# Patient Record
Sex: FEMALE | Race: OTHER | HISPANIC OR LATINO | URBAN - METROPOLITAN AREA
[De-identification: names, ages, dates, MRNs, and addresses within clinical notes are randomized per-mention and may not be internally consistent; named-entity substitution may affect disease eponyms.]

---

## 2017-03-07 ENCOUNTER — OUTPATIENT (OUTPATIENT)
Dept: OUTPATIENT SERVICES | Facility: HOSPITAL | Age: 62
LOS: 1 days | End: 2017-03-07
Payer: MEDICARE

## 2017-03-07 ENCOUNTER — APPOINTMENT (OUTPATIENT)
Dept: MAMMOGRAPHY | Facility: CLINIC | Age: 62
End: 2017-03-07

## 2017-03-07 DIAGNOSIS — Z98.89 OTHER SPECIFIED POSTPROCEDURAL STATES: Chronic | ICD-10-CM

## 2017-03-07 DIAGNOSIS — Z00.8 ENCOUNTER FOR OTHER GENERAL EXAMINATION: ICD-10-CM

## 2017-03-07 PROCEDURE — 77063 BREAST TOMOSYNTHESIS BI: CPT

## 2017-03-07 PROCEDURE — 77067 SCR MAMMO BI INCL CAD: CPT

## 2018-05-18 ENCOUNTER — OUTPATIENT (OUTPATIENT)
Dept: OUTPATIENT SERVICES | Facility: HOSPITAL | Age: 63
LOS: 1 days | End: 2018-05-18
Payer: MEDICARE

## 2018-05-18 ENCOUNTER — APPOINTMENT (OUTPATIENT)
Dept: MAMMOGRAPHY | Facility: CLINIC | Age: 63
End: 2018-05-18
Payer: MEDICARE

## 2018-05-18 DIAGNOSIS — Z98.89 OTHER SPECIFIED POSTPROCEDURAL STATES: Chronic | ICD-10-CM

## 2018-05-18 DIAGNOSIS — Z12.31 ENCOUNTER FOR SCREENING MAMMOGRAM FOR MALIGNANT NEOPLASM OF BREAST: ICD-10-CM

## 2018-05-18 PROCEDURE — 77063 BREAST TOMOSYNTHESIS BI: CPT

## 2018-05-18 PROCEDURE — 77067 SCR MAMMO BI INCL CAD: CPT | Mod: 26

## 2018-05-18 PROCEDURE — 77067 SCR MAMMO BI INCL CAD: CPT

## 2018-05-18 PROCEDURE — 77063 BREAST TOMOSYNTHESIS BI: CPT | Mod: 26

## 2019-06-04 ENCOUNTER — OUTPATIENT (OUTPATIENT)
Dept: OUTPATIENT SERVICES | Facility: HOSPITAL | Age: 64
LOS: 1 days | End: 2019-06-04
Payer: MEDICARE

## 2019-06-04 ENCOUNTER — APPOINTMENT (OUTPATIENT)
Dept: MAMMOGRAPHY | Facility: CLINIC | Age: 64
End: 2019-06-04
Payer: MEDICARE

## 2019-06-04 DIAGNOSIS — Z12.31 ENCOUNTER FOR SCREENING MAMMOGRAM FOR MALIGNANT NEOPLASM OF BREAST: ICD-10-CM

## 2019-06-04 DIAGNOSIS — Z98.89 OTHER SPECIFIED POSTPROCEDURAL STATES: Chronic | ICD-10-CM

## 2019-06-04 PROCEDURE — 77067 SCR MAMMO BI INCL CAD: CPT | Mod: 26

## 2019-06-04 PROCEDURE — 77067 SCR MAMMO BI INCL CAD: CPT

## 2019-06-04 PROCEDURE — 77063 BREAST TOMOSYNTHESIS BI: CPT

## 2019-06-04 PROCEDURE — 77063 BREAST TOMOSYNTHESIS BI: CPT | Mod: 26

## 2021-07-20 ENCOUNTER — OUTPATIENT (OUTPATIENT)
Dept: OUTPATIENT SERVICES | Facility: HOSPITAL | Age: 66
LOS: 1 days | End: 2021-07-20
Payer: MEDICARE

## 2021-07-20 ENCOUNTER — APPOINTMENT (OUTPATIENT)
Dept: MAMMOGRAPHY | Facility: CLINIC | Age: 66
End: 2021-07-20
Payer: MEDICARE

## 2021-07-20 DIAGNOSIS — Z98.89 OTHER SPECIFIED POSTPROCEDURAL STATES: Chronic | ICD-10-CM

## 2021-07-20 DIAGNOSIS — Z00.00 ENCOUNTER FOR GENERAL ADULT MEDICAL EXAMINATION WITHOUT ABNORMAL FINDINGS: ICD-10-CM

## 2021-07-20 PROCEDURE — 77063 BREAST TOMOSYNTHESIS BI: CPT

## 2021-07-20 PROCEDURE — 77067 SCR MAMMO BI INCL CAD: CPT | Mod: 26

## 2021-07-20 PROCEDURE — 77067 SCR MAMMO BI INCL CAD: CPT

## 2021-07-20 PROCEDURE — 77063 BREAST TOMOSYNTHESIS BI: CPT | Mod: 26

## 2022-10-26 ENCOUNTER — EMERGENCY (EMERGENCY)
Facility: HOSPITAL | Age: 67
LOS: 1 days | Discharge: HOME | End: 2022-10-26
Attending: EMERGENCY MEDICINE
Payer: MEDICARE

## 2022-10-26 VITALS
HEART RATE: 119 BPM | DIASTOLIC BLOOD PRESSURE: 55 MMHG | TEMPERATURE: 99 F | RESPIRATION RATE: 18 BRPM | OXYGEN SATURATION: 96 % | SYSTOLIC BLOOD PRESSURE: 99 MMHG

## 2022-10-26 VITALS
HEART RATE: 121 BPM | RESPIRATION RATE: 20 BRPM | TEMPERATURE: 100 F | HEIGHT: 55 IN | WEIGHT: 93.26 LBS | OXYGEN SATURATION: 95 % | SYSTOLIC BLOOD PRESSURE: 96 MMHG | DIASTOLIC BLOOD PRESSURE: 57 MMHG

## 2022-10-26 DIAGNOSIS — Z98.89 OTHER SPECIFIED POSTPROCEDURAL STATES: Chronic | ICD-10-CM

## 2022-10-26 LAB
ALBUMIN SERPL ELPH-MCNC: 3.9 G/DL — SIGNIFICANT CHANGE UP (ref 3.3–5.2)
ALP SERPL-CCNC: 66 U/L — SIGNIFICANT CHANGE UP (ref 40–120)
ALT FLD-CCNC: 21 U/L — SIGNIFICANT CHANGE UP
ANION GAP SERPL CALC-SCNC: 18 MMOL/L — HIGH (ref 5–17)
APTT BLD: 31.2 SEC — SIGNIFICANT CHANGE UP (ref 27.5–35.5)
AST SERPL-CCNC: 40 U/L — HIGH
BASOPHILS # BLD AUTO: 0.02 K/UL — SIGNIFICANT CHANGE UP (ref 0–0.2)
BASOPHILS NFR BLD AUTO: 0.3 % — SIGNIFICANT CHANGE UP (ref 0–2)
BILIRUB SERPL-MCNC: 0.3 MG/DL — LOW (ref 0.4–2)
BLD GP AB SCN SERPL QL: SIGNIFICANT CHANGE UP
BUN SERPL-MCNC: 12.9 MG/DL — SIGNIFICANT CHANGE UP (ref 8–20)
CALCIUM SERPL-MCNC: 8.5 MG/DL — SIGNIFICANT CHANGE UP (ref 8.4–10.5)
CHLORIDE SERPL-SCNC: 95 MMOL/L — LOW (ref 96–108)
CO2 SERPL-SCNC: 19 MMOL/L — LOW (ref 22–29)
CREAT SERPL-MCNC: 0.6 MG/DL — SIGNIFICANT CHANGE UP (ref 0.5–1.3)
EGFR: 98 ML/MIN/1.73M2 — SIGNIFICANT CHANGE UP
EOSINOPHIL # BLD AUTO: 0.01 K/UL — SIGNIFICANT CHANGE UP (ref 0–0.5)
EOSINOPHIL NFR BLD AUTO: 0.1 % — SIGNIFICANT CHANGE UP (ref 0–6)
GLUCOSE SERPL-MCNC: 146 MG/DL — HIGH (ref 70–99)
HCT VFR BLD CALC: 38.5 % — SIGNIFICANT CHANGE UP (ref 34.5–45)
HGB BLD-MCNC: 13 G/DL — SIGNIFICANT CHANGE UP (ref 11.5–15.5)
IMM GRANULOCYTES NFR BLD AUTO: 0.6 % — SIGNIFICANT CHANGE UP (ref 0–0.9)
INR BLD: 1.25 RATIO — HIGH (ref 0.88–1.16)
LIDOCAIN IGE QN: 26 U/L — SIGNIFICANT CHANGE UP (ref 22–51)
LYMPHOCYTES # BLD AUTO: 0.28 K/UL — LOW (ref 1–3.3)
LYMPHOCYTES # BLD AUTO: 4 % — LOW (ref 13–44)
MCHC RBC-ENTMCNC: 30.8 PG — SIGNIFICANT CHANGE UP (ref 27–34)
MCHC RBC-ENTMCNC: 33.8 GM/DL — SIGNIFICANT CHANGE UP (ref 32–36)
MCV RBC AUTO: 91.2 FL — SIGNIFICANT CHANGE UP (ref 80–100)
MONOCYTES # BLD AUTO: 0.62 K/UL — SIGNIFICANT CHANGE UP (ref 0–0.9)
MONOCYTES NFR BLD AUTO: 8.9 % — SIGNIFICANT CHANGE UP (ref 2–14)
NEUTROPHILS # BLD AUTO: 6.02 K/UL — SIGNIFICANT CHANGE UP (ref 1.8–7.4)
NEUTROPHILS NFR BLD AUTO: 86.1 % — HIGH (ref 43–77)
PLATELET # BLD AUTO: 116 K/UL — LOW (ref 150–400)
POTASSIUM SERPL-MCNC: 5.1 MMOL/L — SIGNIFICANT CHANGE UP (ref 3.5–5.3)
POTASSIUM SERPL-SCNC: 5.1 MMOL/L — SIGNIFICANT CHANGE UP (ref 3.5–5.3)
PROT SERPL-MCNC: 7.3 G/DL — SIGNIFICANT CHANGE UP (ref 6.6–8.7)
PROTHROM AB SERPL-ACNC: 14.5 SEC — HIGH (ref 10.5–13.4)
RBC # BLD: 4.22 M/UL — SIGNIFICANT CHANGE UP (ref 3.8–5.2)
RBC # FLD: 13.1 % — SIGNIFICANT CHANGE UP (ref 10.3–14.5)
SODIUM SERPL-SCNC: 132 MMOL/L — LOW (ref 135–145)
WBC # BLD: 6.99 K/UL — SIGNIFICANT CHANGE UP (ref 3.8–10.5)
WBC # FLD AUTO: 6.99 K/UL — SIGNIFICANT CHANGE UP (ref 3.8–10.5)

## 2022-10-26 PROCEDURE — 99284 EMERGENCY DEPT VISIT MOD MDM: CPT

## 2022-10-26 PROCEDURE — G1004: CPT

## 2022-10-26 PROCEDURE — 36415 COLL VENOUS BLD VENIPUNCTURE: CPT

## 2022-10-26 PROCEDURE — 85610 PROTHROMBIN TIME: CPT

## 2022-10-26 PROCEDURE — 86901 BLOOD TYPING SEROLOGIC RH(D): CPT

## 2022-10-26 PROCEDURE — 83690 ASSAY OF LIPASE: CPT

## 2022-10-26 PROCEDURE — 99285 EMERGENCY DEPT VISIT HI MDM: CPT | Mod: 25

## 2022-10-26 PROCEDURE — 93005 ELECTROCARDIOGRAM TRACING: CPT

## 2022-10-26 PROCEDURE — 85025 COMPLETE CBC W/AUTO DIFF WBC: CPT

## 2022-10-26 PROCEDURE — 93010 ELECTROCARDIOGRAM REPORT: CPT

## 2022-10-26 PROCEDURE — 80053 COMPREHEN METABOLIC PANEL: CPT

## 2022-10-26 PROCEDURE — 86850 RBC ANTIBODY SCREEN: CPT

## 2022-10-26 PROCEDURE — 96374 THER/PROPH/DIAG INJ IV PUSH: CPT | Mod: XU

## 2022-10-26 PROCEDURE — 86900 BLOOD TYPING SEROLOGIC ABO: CPT

## 2022-10-26 PROCEDURE — 85730 THROMBOPLASTIN TIME PARTIAL: CPT

## 2022-10-26 PROCEDURE — 74177 CT ABD & PELVIS W/CONTRAST: CPT | Mod: MG

## 2022-10-26 PROCEDURE — 74177 CT ABD & PELVIS W/CONTRAST: CPT | Mod: 26,MG

## 2022-10-26 RX ORDER — ACETAMINOPHEN 500 MG
625 TABLET ORAL ONCE
Refills: 0 | Status: COMPLETED | OUTPATIENT
Start: 2022-10-26 | End: 2022-10-26

## 2022-10-26 RX ADMIN — Medication 250 MILLIGRAM(S): at 11:12

## 2022-10-26 NOTE — ED ADULT TRIAGE NOTE - CHIEF COMPLAINT QUOTE
67F  with MR and DM bib brother for abdominal pain and back pain onset this morning. Pt sent for EKG. 67F  with MR and DM bib brother for abdominal pain and back pain onset this morning. Pt sent for EKG. Denies vomiting, noted to be tachy, hypotensive and oral temp of 99.8.

## 2022-10-26 NOTE — ED ADULT NURSE NOTE - NS ED NURSE LEVEL OF CONSCIOUSNESS ORIENTATION
[phone]  Receiving Agency/Facility address: [facility]  Receiving Agency/Facility city/state: [place]  Receiving Agency/Facility Comment: Confirmed with [facility] facility does not need report   Oriented - self; Oriented - place; Oriented - time

## 2022-10-26 NOTE — ED ADULT NURSE NOTE - OBJECTIVE STATEMENT
patient c/o bilateral lower abodminal pain x 1 day increasing in severity radiating to back denies fevers sweats chills. denies urinary sympomts.    brother at bed side he is primary care giver, per brother pt has history of MR

## 2022-10-26 NOTE — ED PROVIDER NOTE - PATIENT PORTAL LINK FT
You can access the FollowMyHealth Patient Portal offered by [facility] by registering at the following website: [email or web address]. By joining [facility]’s FollowMyHealth portal, you will also be able to view your health information using other applications (apps) compatible with our system.

## 2022-10-26 NOTE — ED PROVIDER NOTE - OBJECTIVE STATEMENT
pt presents able to give full history 1 day diffuse crampy abd pain constant achy non radiating . she is passing flatus. + nausea. no diarrhea. no blood in stool.  no headache no chest pain or sob. no fever. acting normally per her brother at bedside

## 2022-10-31 ENCOUNTER — INPATIENT (INPATIENT)
Facility: HOSPITAL | Age: 67
LOS: 3 days | Discharge: HOME | DRG: 637 | End: 2022-11-04
Attending: GENERAL ACUTE CARE HOSPITAL | Admitting: HOSPITALIST
Payer: MEDICARE

## 2022-10-31 VITALS
DIASTOLIC BLOOD PRESSURE: 70 MMHG | HEART RATE: 105 BPM | HEIGHT: 55 IN | RESPIRATION RATE: 18 BRPM | TEMPERATURE: 98 F | SYSTOLIC BLOOD PRESSURE: 113 MMHG | OXYGEN SATURATION: 97 % | WEIGHT: 100.09 LBS

## 2022-10-31 DIAGNOSIS — Z98.89 OTHER SPECIFIED POSTPROCEDURAL STATES: Chronic | ICD-10-CM

## 2022-10-31 LAB
ALBUMIN SERPL ELPH-MCNC: 4.1 G/DL — SIGNIFICANT CHANGE UP (ref 3.3–5.2)
ALP SERPL-CCNC: 68 U/L — SIGNIFICANT CHANGE UP (ref 40–120)
ALT FLD-CCNC: 27 U/L — SIGNIFICANT CHANGE UP
ANION GAP SERPL CALC-SCNC: 27 MMOL/L — HIGH (ref 5–17)
AST SERPL-CCNC: 34 U/L — HIGH
BASOPHILS # BLD AUTO: 0.02 K/UL — SIGNIFICANT CHANGE UP (ref 0–0.2)
BASOPHILS NFR BLD AUTO: 0.2 % — SIGNIFICANT CHANGE UP (ref 0–2)
BILIRUB SERPL-MCNC: 0.3 MG/DL — LOW (ref 0.4–2)
BUN SERPL-MCNC: 29.7 MG/DL — HIGH (ref 8–20)
CALCIUM SERPL-MCNC: 9.3 MG/DL — SIGNIFICANT CHANGE UP (ref 8.4–10.5)
CHLORIDE SERPL-SCNC: 91 MMOL/L — LOW (ref 96–108)
CO2 SERPL-SCNC: 9 MMOL/L — CRITICAL LOW (ref 22–29)
CREAT SERPL-MCNC: 0.97 MG/DL — SIGNIFICANT CHANGE UP (ref 0.5–1.3)
EGFR: 64 ML/MIN/1.73M2 — SIGNIFICANT CHANGE UP
EOSINOPHIL # BLD AUTO: 0 K/UL — SIGNIFICANT CHANGE UP (ref 0–0.5)
EOSINOPHIL NFR BLD AUTO: 0 % — SIGNIFICANT CHANGE UP (ref 0–6)
GLUCOSE SERPL-MCNC: 152 MG/DL — HIGH (ref 70–99)
HCT VFR BLD CALC: 44.6 % — SIGNIFICANT CHANGE UP (ref 34.5–45)
HGB BLD-MCNC: 15.7 G/DL — HIGH (ref 11.5–15.5)
IMM GRANULOCYTES NFR BLD AUTO: 0.2 % — SIGNIFICANT CHANGE UP (ref 0–0.9)
LIDOCAIN IGE QN: 143 U/L — HIGH (ref 22–51)
LYMPHOCYTES # BLD AUTO: 0.73 K/UL — LOW (ref 1–3.3)
LYMPHOCYTES # BLD AUTO: 8.9 % — LOW (ref 13–44)
MCHC RBC-ENTMCNC: 30.7 PG — SIGNIFICANT CHANGE UP (ref 27–34)
MCHC RBC-ENTMCNC: 35.2 GM/DL — SIGNIFICANT CHANGE UP (ref 32–36)
MCV RBC AUTO: 87.1 FL — SIGNIFICANT CHANGE UP (ref 80–100)
MONOCYTES # BLD AUTO: 0.62 K/UL — SIGNIFICANT CHANGE UP (ref 0–0.9)
MONOCYTES NFR BLD AUTO: 7.5 % — SIGNIFICANT CHANGE UP (ref 2–14)
NEUTROPHILS # BLD AUTO: 6.85 K/UL — SIGNIFICANT CHANGE UP (ref 1.8–7.4)
NEUTROPHILS NFR BLD AUTO: 83.2 % — HIGH (ref 43–77)
PLATELET # BLD AUTO: 126 K/UL — LOW (ref 150–400)
POTASSIUM SERPL-MCNC: 4.8 MMOL/L — SIGNIFICANT CHANGE UP (ref 3.5–5.3)
POTASSIUM SERPL-SCNC: 4.8 MMOL/L — SIGNIFICANT CHANGE UP (ref 3.5–5.3)
PROT SERPL-MCNC: 7.4 G/DL — SIGNIFICANT CHANGE UP (ref 6.6–8.7)
RBC # BLD: 5.12 M/UL — SIGNIFICANT CHANGE UP (ref 3.8–5.2)
RBC # FLD: 13.3 % — SIGNIFICANT CHANGE UP (ref 10.3–14.5)
SODIUM SERPL-SCNC: 127 MMOL/L — LOW (ref 135–145)
WBC # BLD: 8.24 K/UL — SIGNIFICANT CHANGE UP (ref 3.8–10.5)
WBC # FLD AUTO: 8.24 K/UL — SIGNIFICANT CHANGE UP (ref 3.8–10.5)

## 2022-10-31 PROCEDURE — 99285 EMERGENCY DEPT VISIT HI MDM: CPT | Mod: CS

## 2022-10-31 RX ORDER — SODIUM CHLORIDE 9 MG/ML
1000 INJECTION, SOLUTION INTRAVENOUS
Refills: 0 | Status: DISCONTINUED | OUTPATIENT
Start: 2022-10-31 | End: 2022-11-01

## 2022-10-31 RX ORDER — ACETAMINOPHEN 500 MG
675 TABLET ORAL ONCE
Refills: 0 | Status: DISCONTINUED | OUTPATIENT
Start: 2022-10-31 | End: 2022-10-31

## 2022-10-31 RX ORDER — SODIUM CHLORIDE 9 MG/ML
1000 INJECTION, SOLUTION INTRAVENOUS ONCE
Refills: 0 | Status: COMPLETED | OUTPATIENT
Start: 2022-10-31 | End: 2022-10-31

## 2022-10-31 RX ADMIN — SODIUM CHLORIDE 2000 MILLILITER(S): 9 INJECTION, SOLUTION INTRAVENOUS at 20:38

## 2022-10-31 NOTE — ED PROVIDER NOTE - HIV OFFER
Problem: Patient Centered Care  Goal: Patient preferences are identified and integrated in the patient's plan of care  Interventions:  - What would you like us to know as we care for you?  - Provide timely, complete, and accurate information to patient/fam ordered  - Implement neutropenic guidelines   Outcome: Progressing      Problem: SAFETY ADULT - FALL  Goal: Free from fall injury  INTERVENTIONS:  - Assess pt frequently for physical needs  - Identify cognitive and physical deficits and behaviors that affe Previously Declined (within the last year)

## 2022-10-31 NOTE — ED PROVIDER NOTE - OBJECTIVE STATEMENT
Critical Care H&P/Consult     NAME: Ward Diaz - ROOM: [address] [place] [address] - MRN: [id number] - Age: 80year old - :  11/10/1928    Date of Admission: 3/18/2019  4:05 PM  Admission Diagnosis: Nosocomial pneumonia [J18.9]  Urinary tract infection associated Unspecified essential hypertension      Past Surgical History:   Procedure Laterality Date   • HIP PINNING Right 6/9/2018    Performed by Marifer Godfrey MD at [address] [place] [address] MAIN OR   • HIP REPLACEMENT SURGERY     • HIP SURGERY Left 2011   • HIP SURGERY Right     S/ 0.9% 50 mL infusion for shock 0.04 Units/min Intravenous Continuous PRN   Meropenem (MERREM) 500 mg in sodium chloride 0.9% 100 mL  mg Intravenous Q12H   [START ON 3/20/2019] Vancomycin HCl (VANCOCIN) 1,500 mg in sodium chloride 0.9% 500 mL IVPB 15 2.2*  1.9*   NA  140  138   --   141   K  3.4*   --   3.5  3.7   CL  101  101   --   106   CO2  27.0  22.0   --   26.0   ALKPHO   --    --   229*  204*   AST   --    --   30  23   ALT   --    --   19  18   BILT   --    --   0.7  0.6   TP   --    --   6.4 66 y/o female with PMHx of DM presents to the ED c/o generalized weakness and decreased PO intake. Pt brother states pt has been eating and drinking less. Pt with negative CT 5 days ago. denies fever. denies HA or neck pain. no chest pain or sob. no n/v/d. no urinary f/u/d. no back pain. no motor or sensory deficits. denies illicit drug use. no recent travel. no rash. no other acute issues symptoms or concerns

## 2022-10-31 NOTE — ED PROVIDER NOTE - CLINICAL SUMMARY MEDICAL DECISION MAKING FREE TEXT BOX
pt found to be acidotic likely 2/2 starvation ketosis from decreased po intake, hydrated, no abd pain, ct scan neg from 5 days ago,

## 2022-10-31 NOTE — ED PROVIDER NOTE - PROGRESS NOTE DETAILS
Yvonne Nieves for ED attending, Dr. Yoo: spoke to brother, denies depression in pt, will check labs and re-evaluate. I am familiar with pt from prior visit.

## 2022-10-31 NOTE — ED ADULT TRIAGE NOTE - CHIEF COMPLAINT QUOTE
Quality 226: Preventive Care And Screening: Tobacco Use: Screening And Cessation Intervention: Patient screened for tobacco use and is an ex/non-smoker Quality 110: Preventive Care And Screening: Influenza Immunization: Influenza Immunization previously received during influenza season Detail Level: Detailed Patient wheeled into ED, Pt c/o abd pain, and throat pain, no appetite has not eaten in 6 days Quality 130: Documentation Of Current Medications In The Medical Record: Current Medications Documented Quality 431: Preventive Care And Screening: Unhealthy Alcohol Use - Screening: Patient screened for unhealthy alcohol use using a single question and scores less than 2 times per year

## 2022-10-31 NOTE — ED PROVIDER NOTE - PSYCHIATRIC, MLM
Patient Instructions by Vanessa Juarez MD at 05/12/17 08:21 AM     Author:  Vanessa Juarez MD Service:  (none) Author Type:  Physician     Filed:  05/12/17 08:24 AM Encounter Date:  5/12/2017 Status:  Signed     :  Vanessa Juarez MD (Physician)            PATIENT INFORMATION     Recheck a1c in 3 months    Will recheck urine then too  Take all medications the same time with the lantus to improve how you are taking them  Increase fluoxetine--20mg daily--so take 2 of the 10mg to use up and then change over to the 20 mg daily which is at the pharmacy    Lab Results      Component  Value Date    A1C 9.7 05/12/2017     Aim A1c to 7% or less.    Walk 20 min daily.      Follow-Up  -- Make an appointment with Vanessa Juarez MD in 3 months.    Additional Educational Resources:  For additional resources regarding your symptoms, diagnosis, or further health information, please visit the Health Resources section on [email or web address] or the Online Health Resources section in [email or web address] ([employer]).          Revision History        User Key Date/Time User Provider Type Action    > [N/A] 05/12/17 08:24 AM Vanessa Juarez MD Physician Sign             Alert and oriented to person, place, time/situation. normal mood and affect. no apparent risk to self or others.

## 2022-10-31 NOTE — ED ADULT TRIAGE NOTE - INTERNATIONAL TRAVEL
No direct patient care (not related to procedure)/interpretation of diagnostic studies/documentation/consultation with other physicians/consult w/ pt's family directly relating to pts condition

## 2022-11-01 DIAGNOSIS — T73.0XXA STARVATION, INITIAL ENCOUNTER: ICD-10-CM

## 2022-11-01 LAB
ACETONE SERPL-MCNC: ABNORMAL
ALBUMIN SERPL ELPH-MCNC: 3.4 G/DL — SIGNIFICANT CHANGE UP (ref 3.3–5.2)
ALP SERPL-CCNC: 58 U/L — SIGNIFICANT CHANGE UP (ref 40–120)
ALT FLD-CCNC: 23 U/L — SIGNIFICANT CHANGE UP
ANION GAP SERPL CALC-SCNC: 15 MMOL/L — SIGNIFICANT CHANGE UP (ref 5–17)
AST SERPL-CCNC: 36 U/L — HIGH
BASE EXCESS BLDV CALC-SCNC: -15.4 MMOL/L — LOW (ref -2–3)
BILIRUB SERPL-MCNC: 0.4 MG/DL — SIGNIFICANT CHANGE UP (ref 0.4–2)
BUN SERPL-MCNC: 17 MG/DL — SIGNIFICANT CHANGE UP (ref 8–20)
CALCIUM SERPL-MCNC: 8.6 MG/DL — SIGNIFICANT CHANGE UP (ref 8.4–10.5)
CHLORIDE SERPL-SCNC: 97 MMOL/L — SIGNIFICANT CHANGE UP (ref 96–108)
CO2 SERPL-SCNC: 19 MMOL/L — LOW (ref 22–29)
CREAT SERPL-MCNC: 0.65 MG/DL — SIGNIFICANT CHANGE UP (ref 0.5–1.3)
EGFR: 96 ML/MIN/1.73M2 — SIGNIFICANT CHANGE UP
FLUAV AG NPH QL: SIGNIFICANT CHANGE UP
FLUBV AG NPH QL: SIGNIFICANT CHANGE UP
GAS PNL BLDV: SIGNIFICANT CHANGE UP
GLUCOSE BLDC GLUCOMTR-MCNC: 140 MG/DL — HIGH (ref 70–99)
GLUCOSE BLDC GLUCOMTR-MCNC: 145 MG/DL — HIGH (ref 70–99)
GLUCOSE BLDC GLUCOMTR-MCNC: 195 MG/DL — HIGH (ref 70–99)
GLUCOSE BLDC GLUCOMTR-MCNC: 76 MG/DL — SIGNIFICANT CHANGE UP (ref 70–99)
GLUCOSE BLDC GLUCOMTR-MCNC: 87 MG/DL — SIGNIFICANT CHANGE UP (ref 70–99)
GLUCOSE BLDC GLUCOMTR-MCNC: 89 MG/DL — SIGNIFICANT CHANGE UP (ref 70–99)
GLUCOSE BLDC GLUCOMTR-MCNC: 94 MG/DL — SIGNIFICANT CHANGE UP (ref 70–99)
GLUCOSE SERPL-MCNC: 95 MG/DL — SIGNIFICANT CHANGE UP (ref 70–99)
HCO3 BLDV-SCNC: 12 MMOL/L — LOW (ref 22–29)
HCT VFR BLD CALC: 37.9 % — SIGNIFICANT CHANGE UP (ref 34.5–45)
HGB BLD-MCNC: 13.4 G/DL — SIGNIFICANT CHANGE UP (ref 11.5–15.5)
MAGNESIUM SERPL-MCNC: 1.9 MG/DL — SIGNIFICANT CHANGE UP (ref 1.6–2.6)
MCHC RBC-ENTMCNC: 30.4 PG — SIGNIFICANT CHANGE UP (ref 27–34)
MCHC RBC-ENTMCNC: 35.4 GM/DL — SIGNIFICANT CHANGE UP (ref 32–36)
MCV RBC AUTO: 85.9 FL — SIGNIFICANT CHANGE UP (ref 80–100)
OSMOLALITY SERPL: 292 MOSMOL/KG — SIGNIFICANT CHANGE UP (ref 280–301)
PCO2 BLDV: 30 MMHG — LOW (ref 39–42)
PH BLDV: 7.22 — LOW (ref 7.32–7.43)
PHOSPHATE SERPL-MCNC: 1 MG/DL — CRITICAL LOW (ref 2.4–4.7)
PLATELET # BLD AUTO: 111 K/UL — LOW (ref 150–400)
PO2 BLDV: 77 MMHG — HIGH (ref 25–45)
POTASSIUM SERPL-MCNC: 4 MMOL/L — SIGNIFICANT CHANGE UP (ref 3.5–5.3)
POTASSIUM SERPL-SCNC: 4 MMOL/L — SIGNIFICANT CHANGE UP (ref 3.5–5.3)
PROT SERPL-MCNC: 6.6 G/DL — SIGNIFICANT CHANGE UP (ref 6.6–8.7)
RBC # BLD: 4.41 M/UL — SIGNIFICANT CHANGE UP (ref 3.8–5.2)
RBC # FLD: 13.2 % — SIGNIFICANT CHANGE UP (ref 10.3–14.5)
RSV RNA NPH QL NAA+NON-PROBE: SIGNIFICANT CHANGE UP
SAO2 % BLDV: 94.8 % — SIGNIFICANT CHANGE UP
SARS-COV-2 RNA SPEC QL NAA+PROBE: DETECTED
SODIUM SERPL-SCNC: 131 MMOL/L — LOW (ref 135–145)
WBC # BLD: 7.18 K/UL — SIGNIFICANT CHANGE UP (ref 3.8–10.5)
WBC # FLD AUTO: 7.18 K/UL — SIGNIFICANT CHANGE UP (ref 3.8–10.5)

## 2022-11-01 PROCEDURE — 99222 1ST HOSP IP/OBS MODERATE 55: CPT

## 2022-11-01 PROCEDURE — 93010 ELECTROCARDIOGRAM REPORT: CPT

## 2022-11-01 PROCEDURE — 99221 1ST HOSP IP/OBS SF/LOW 40: CPT | Mod: GC

## 2022-11-01 PROCEDURE — 99223 1ST HOSP IP/OBS HIGH 75: CPT

## 2022-11-01 RX ORDER — ENOXAPARIN SODIUM 100 MG/ML
40 INJECTION SUBCUTANEOUS EVERY 24 HOURS
Refills: 0 | Status: DISCONTINUED | OUTPATIENT
Start: 2022-11-01 | End: 2022-11-02

## 2022-11-01 RX ORDER — POTASSIUM PHOSPHATE, MONOBASIC POTASSIUM PHOSPHATE, DIBASIC 236; 224 MG/ML; MG/ML
30 INJECTION, SOLUTION INTRAVENOUS ONCE
Refills: 0 | Status: COMPLETED | OUTPATIENT
Start: 2022-11-01 | End: 2022-11-01

## 2022-11-01 RX ORDER — LISINOPRIL 2.5 MG/1
1 TABLET ORAL
Qty: 0 | Refills: 0 | DISCHARGE

## 2022-11-01 RX ORDER — INFLUENZA VIRUS VACCINE 15; 15; 15; 15 UG/.5ML; UG/.5ML; UG/.5ML; UG/.5ML
0.7 SUSPENSION INTRAMUSCULAR ONCE
Refills: 0 | Status: DISCONTINUED | OUTPATIENT
Start: 2022-11-01 | End: 2022-11-04

## 2022-11-01 RX ORDER — INSULIN HUMAN 100 [IU]/ML
6 INJECTION, SOLUTION SUBCUTANEOUS
Qty: 50 | Refills: 0 | Status: DISCONTINUED | OUTPATIENT
Start: 2022-11-01 | End: 2022-11-01

## 2022-11-01 RX ORDER — INSULIN LISPRO 100/ML
3 VIAL (ML) SUBCUTANEOUS
Refills: 0 | Status: DISCONTINUED | OUTPATIENT
Start: 2022-11-01 | End: 2022-11-02

## 2022-11-01 RX ORDER — MAGNESIUM SULFATE 500 MG/ML
2 VIAL (ML) INJECTION ONCE
Refills: 0 | Status: COMPLETED | OUTPATIENT
Start: 2022-11-01 | End: 2022-11-01

## 2022-11-01 RX ORDER — SODIUM CHLORIDE 9 MG/ML
1000 INJECTION, SOLUTION INTRAVENOUS
Refills: 0 | Status: DISCONTINUED | OUTPATIENT
Start: 2022-11-01 | End: 2022-11-02

## 2022-11-01 RX ORDER — SODIUM CHLORIDE 9 MG/ML
1000 INJECTION, SOLUTION INTRAVENOUS ONCE
Refills: 0 | Status: COMPLETED | OUTPATIENT
Start: 2022-11-01 | End: 2022-11-01

## 2022-11-01 RX ORDER — CHLORHEXIDINE GLUCONATE 213 G/1000ML
1 SOLUTION TOPICAL
Refills: 0 | Status: DISCONTINUED | OUTPATIENT
Start: 2022-11-01 | End: 2022-11-04

## 2022-11-01 RX ORDER — SITAGLIPTIN 50 MG/1
1 TABLET, FILM COATED ORAL
Qty: 0 | Refills: 0 | DISCHARGE

## 2022-11-01 RX ORDER — SODIUM CHLORIDE 9 MG/ML
1000 INJECTION, SOLUTION INTRAVENOUS ONCE
Refills: 0 | Status: DISCONTINUED | OUTPATIENT
Start: 2022-11-01 | End: 2022-11-02

## 2022-11-01 RX ORDER — ATORVASTATIN CALCIUM 80 MG/1
20 TABLET, FILM COATED ORAL AT BEDTIME
Refills: 0 | Status: DISCONTINUED | OUTPATIENT
Start: 2022-11-01 | End: 2022-11-04

## 2022-11-01 RX ORDER — INSULIN GLARGINE 100 [IU]/ML
8 INJECTION, SOLUTION SUBCUTANEOUS EVERY MORNING
Refills: 0 | Status: DISCONTINUED | OUTPATIENT
Start: 2022-11-01 | End: 2022-11-03

## 2022-11-01 RX ORDER — DEXTROSE 50 % IN WATER 50 %
50 SYRINGE (ML) INTRAVENOUS
Refills: 0 | Status: DISCONTINUED | OUTPATIENT
Start: 2022-11-01 | End: 2022-11-04

## 2022-11-01 RX ORDER — ATORVASTATIN CALCIUM 80 MG/1
1 TABLET, FILM COATED ORAL
Qty: 0 | Refills: 0 | DISCHARGE

## 2022-11-01 RX ADMIN — Medication 25 GRAM(S): at 08:42

## 2022-11-01 RX ADMIN — INSULIN HUMAN 6 UNIT(S)/HR: 100 INJECTION, SOLUTION SUBCUTANEOUS at 05:02

## 2022-11-01 RX ADMIN — ATORVASTATIN CALCIUM 20 MILLIGRAM(S): 80 TABLET, FILM COATED ORAL at 21:47

## 2022-11-01 RX ADMIN — Medication 3 UNIT(S): at 11:04

## 2022-11-01 RX ADMIN — ENOXAPARIN SODIUM 40 MILLIGRAM(S): 100 INJECTION SUBCUTANEOUS at 08:42

## 2022-11-01 RX ADMIN — SODIUM CHLORIDE 150 MILLILITER(S): 9 INJECTION, SOLUTION INTRAVENOUS at 06:11

## 2022-11-01 RX ADMIN — SODIUM CHLORIDE 150 MILLILITER(S): 9 INJECTION, SOLUTION INTRAVENOUS at 21:50

## 2022-11-01 RX ADMIN — Medication 3 UNIT(S): at 17:24

## 2022-11-01 RX ADMIN — SODIUM CHLORIDE 1000 MILLILITER(S): 9 INJECTION, SOLUTION INTRAVENOUS at 03:55

## 2022-11-01 RX ADMIN — POTASSIUM PHOSPHATE, MONOBASIC POTASSIUM PHOSPHATE, DIBASIC 83.33 MILLIMOLE(S): 236; 224 INJECTION, SOLUTION INTRAVENOUS at 10:46

## 2022-11-01 RX ADMIN — INSULIN GLARGINE 8 UNIT(S): 100 INJECTION, SOLUTION SUBCUTANEOUS at 11:04

## 2022-11-01 RX ADMIN — SODIUM CHLORIDE 1000 MILLILITER(S): 9 INJECTION, SOLUTION INTRAVENOUS at 03:09

## 2022-11-01 NOTE — PROGRESS NOTE ADULT - ASSESSMENT
67 year old female with DM (on Jardiance) admitted to ICU for DKA. Started on insulin ggt with improvement in AG. Now transferred to Medicine for further management       Euglycemic DKA   s/p IVF / insulin ggt   Transitioned to Lantus 8 am  / lispro 3 units TID premeal  Suspect triggered by Covid 19 infection.     Covid 19 infection  Not hypoxic,   Continue isolation precautions    HLD  Atorvastatin 20 mg qhs       DVT ppx : Lovenox  67 year old female with DM (on Jardiance) admitted to ICU for DKA. Started on insulin ggt with improvement in AG. Now transferred to Medicine for further management     Euglycemic DKA   s/p IVF / insulin ggt   Transitioned to Lantus 8 am  / lispro 3 units TID premeal  Suspect triggered by Covid 19 infection.     Covid 19 infection  Not hypoxic,   Continue isolation precautions    HLD  Atorvastatin 20 mg qhs       DVT ppx : Lovenox  67 year old female with DM (on Jardiance) admitted to ICU for DKA. Started on insulin ggt with improvement in AG. Now transferred to Medicine for further management     Euglycemic DKA   s/p IVF / insulin ggt   Transitioned to Lantus 8 am  / lispro 3 units TID premeal  Suspect triggered by Covid 19 infection.     Covid 19 infection  Not hypoxic,   Continue isolation precautions    HLD  Atorvastatin 20 mg qhs     Electrolyte abnormalities'   K Phos repleted  Repeat in am     DVT ppx : Lovenox

## 2022-11-01 NOTE — H&P ADULT - NSHPLABSRESULTS_GEN_ALL_CORE
< from: CT Abdomen and Pelvis w/ IV Cont (10.26.22 @ 12:21) >    ACC: [id number] EXAM:  CT ABDOMEN AND PELVIS IC                          PROCEDURE DATE:  10/26/2022          INTERPRETATION:  CLINICAL INFORMATION: CLINICAL INFORMATION: 67 years    Female with diffuse abd pain.    COMPARISON: 3/29/2004    CONTRAST/COMPLICATIONS:  IV Contrast: Omnipaque 350  87 cc administered   13 cc discarded  Oral Contrast: NONE  Complications: None reported at time of study completion    PROCEDURE:  CT of the Abdomen and Pelvis was performed.  Sagittal and coronal reformats were performed.    FINDINGS:  LOWER CHEST: Within normal limits.    LIVER: Focal fat deposition adjacent to the falciform ligament and   adjacent to the gallbladder fossa.  BILE DUCTS: Normal caliber.  GALLBLADDER: Within normal limits.  SPLEEN: Within normal limits.  PANCREAS: Atrophic.  ADRENALS: Within normal limits.  KIDNEYS/URETERS: Within normal limits.    BLADDER: Within normal limits.  REPRODUCTIVE ORGANS: Unremarkable uterus.    BOWEL: No bowel obstruction. Appendix is normal.  PERITONEUM: No ascites.  VESSELS: Within normal limits.  RETROPERITONEUM/LYMPH NODES: No lymphadenopathy.  ABDOMINAL WALL: Within normal limits.  BONES: Moderate S-shaped scoliosis.  AI VERTEBRAL BODY ANALYSIS:  T11: low bone density of 90 HU, suspicious for osteoporosis.  T12: low bone density of 81 HU, suspicious for osteoporosis.  L1: low bone density of 93 HU, suspicious for osteoporosis.    IMPRESSION:  No acute intra-abdominal pathology.    VERTEBRAL BODY ANALYSIS: Low bone density as described above,consider   further workup for osteoporosis.        --- End of Report ---        < end of copied text >

## 2022-11-01 NOTE — H&P ADULT - NS PANP COMMENT GEN_ALL_CORE FT
67F w/ hx of DM presenting to the ED with weakness and decreased PO intake. Pt was recently seen in the ED secondary to diffuse crampy abdominal pain and CT Abd/pelvis did not show any acute pathology. Pt was noted to have HCO3 19 at that time. Pt now returns with similar symptoms found to have HCO3 9 with VBG 7.22/30. She was also found to be covid positive. Pt was reportedly on Jardiance prior to presentation. She endorses abdominal discomfort and reports an episode of vomiting earlier but hx otherwise limited due to reported cognitive deficit. Pt hemodynamically stable. Pt is awake, alert on exam with mild TTP of the abdomen.    Euglycemic DKA   admit to MICU  will give additional 1L bolus and start D5LR@150cc/hr  will start insulin drip   FS q1h while on insulin drip and chem q4h   check lactate   check UA   check serum/urine osm w/ urine lytes   suspect abdominal pain likely secondary to DKA given recent neg CT abd but if persists with improvement of acidosis would rescan abdomen  afebrile with no clear signs of infection so will monitor off for now; f/u UA     Covid infection   as pt not hypoxic, will hold off on dexamethasone/remdesivir

## 2022-11-01 NOTE — PROGRESS NOTE ADULT - SUBJECTIVE AND OBJECTIVE BOX
[facility] Division of Hospital Medicine    Chief Complaint:  DKA         68 y/o female with DM admitted to ICU with DKA after presenting with abd pain. Imaging was unremarkable. Incidentally found to have Covid 19.     SUBJECTIVE / OVERNIGHT EVENTS:  Reports some abd pain but improved compared to initial presentation   Patient denies chest pain, SOB, abd pain, N/V, fever, chills, dysuria or any other complaints. All remainder ROS negative.       MEDICATIONS  (STANDING):  atorvastatin 20 milliGRAM(s) Oral at bedtime  chlorhexidine 4% Liquid 1 Application(s) Topical <User Schedule>  dextrose 5% + lactated ringers. 1000 milliLiter(s) (150 mL/Hr) IV Continuous <Continuous>  dextrose 50% Injectable 50 milliLiter(s) IV Push every 15 minutes  enoxaparin Injectable 40 milliGRAM(s) SubCutaneous every 24 hours  insulin glargine Injectable (LANTUS) 8 Unit(s) SubCutaneous every morning  insulin lispro Injectable (ADMELOG) 3 Unit(s) SubCutaneous before breakfast  insulin lispro Injectable (ADMELOG) 3 Unit(s) SubCutaneous before lunch  insulin lispro Injectable (ADMELOG) 3 Unit(s) SubCutaneous before dinner    MEDICATIONS  (PRN):        I&O's Summary    31 Oct 2022 07:01  -  01 Nov 2022 07:00  --------------------------------------------------------  IN: 604 mL / OUT: 0 mL / NET: 604 mL    01 Nov 2022 07:01  -  01 Nov 2022 14:27  --------------------------------------------------------  IN: 300 mL / OUT: 0 mL / NET: 300 mL        PHYSICAL EXAM:  Vital Signs Last 24 Hrs  T(C): 36.8 (01 Nov 2022 08:00), Max: 37 (01 Nov 2022 00:03)  T(F): 98.2 (01 Nov 2022 08:00), Max: 98.6 (01 Nov 2022 00:03)  HR: 107 (01 Nov 2022 08:00) (97 - 107)  BP: 105/79 (01 Nov 2022 08:00) (105/79 - 134/77)  BP(mean): 88 (01 Nov 2022 08:00) (71 - 88)  RR: 19 (01 Nov 2022 08:00) (12 - 22)  SpO2: 99% (01 Nov 2022 08:00) (97% - 99%)    Parameters below as of 01 Nov 2022 08:00  Patient On (Oxygen Delivery Method): room air            CONSTITUTIONAL: Non toxic appearing, disheveled female lying in bed  ENMT: Moist oral mucosa, no pharyngeal injection or exudates; poor dentition  RESPIRATORY: Normal respiratory effort; lungs are clear to auscultation bilaterally  CARDIOVASCULAR: Regular rate and rhythm, normal S1 and S2, no murmur/rub/gallop; No lower extremity edema; Peripheral pulses are 2+ bilaterally  ABDOMEN: Nontender to palpation, normoactive bowel sounds, no rebound/guarding; No hepatosplenomegaly  MUSCLOSKELETAL:  no clubbing or cyanosis of digits; no joint swelling or tenderness to palpation  PSYCH: A+O to person, place, and time; flat appropriate  NEUROLOGY: CN 2-12 are intact and symmetric; no gross sensory deficits;   SKIN: No rashes; no palpable lesions    LABS:                        13.4   7.18  )-----------( 111      ( 01 Nov 2022 06:17 )             37.9     11-01    131<L>  |  97  |  17.0  ----------------------------<  95  4.0   |  19.0<L>  |  0.65    Ca    8.6      01 Nov 2022 06:17  Phos  1.0     11-01  Mg     1.9     11-01    TPro  6.6  /  Alb  3.4  /  TBili  0.4  /  DBili  x   /  AST  36<H>  /  ALT  23  /  AlkPhos  58  11-01              CAPILLARY BLOOD GLUCOSE      POCT Blood Glucose.: 195 mg/dL (01 Nov 2022 11:03)  POCT Blood Glucose.: 140 mg/dL (01 Nov 2022 09:31)  POCT Blood Glucose.: 76 mg/dL (01 Nov 2022 08:00)  POCT Blood Glucose.: 87 mg/dL (01 Nov 2022 06:55)  POCT Blood Glucose.: 145 mg/dL (01 Nov 2022 04:55)        RADIOLOGY & ADDITIONAL TESTS:    11/1 CT Abd/Pel   VERTEBRAL BODY ANALYSIS: Low bone density as described above, consider   further workup for osteoporosis.                                             [facility] Division of Hospital Medicine    Chief Complaint:  DKA     66 y/o female with DM admitted to ICU with DKA after presenting with abd pain. Imaging was unremarkable. Incidentally found to have Covid 19.     SUBJECTIVE / OVERNIGHT EVENTS:  Reports some abd pain but improved compared to initial presentation   Patient denies chest pain, SOB, abd pain, N/V, fever, chills, dysuria or any other complaints. All remainder ROS negative.       MEDICATIONS  (STANDING):  atorvastatin 20 milliGRAM(s) Oral at bedtime  chlorhexidine 4% Liquid 1 Application(s) Topical <User Schedule>  dextrose 5% + lactated ringers. 1000 milliLiter(s) (150 mL/Hr) IV Continuous <Continuous>  dextrose 50% Injectable 50 milliLiter(s) IV Push every 15 minutes  enoxaparin Injectable 40 milliGRAM(s) SubCutaneous every 24 hours  insulin glargine Injectable (LANTUS) 8 Unit(s) SubCutaneous every morning  insulin lispro Injectable (ADMELOG) 3 Unit(s) SubCutaneous before breakfast  insulin lispro Injectable (ADMELOG) 3 Unit(s) SubCutaneous before lunch  insulin lispro Injectable (ADMELOG) 3 Unit(s) SubCutaneous before dinner    MEDICATIONS  (PRN):        I&O's Summary    31 Oct 2022 07:01  -  01 Nov 2022 07:00  --------------------------------------------------------  IN: 604 mL / OUT: 0 mL / NET: 604 mL    01 Nov 2022 07:01  -  01 Nov 2022 14:27  --------------------------------------------------------  IN: 300 mL / OUT: 0 mL / NET: 300 mL        PHYSICAL EXAM:  Vital Signs Last 24 Hrs  T(C): 36.8 (01 Nov 2022 08:00), Max: 37 (01 Nov 2022 00:03)  T(F): 98.2 (01 Nov 2022 08:00), Max: 98.6 (01 Nov 2022 00:03)  HR: 107 (01 Nov 2022 08:00) (97 - 107)  BP: 105/79 (01 Nov 2022 08:00) (105/79 - 134/77)  BP(mean): 88 (01 Nov 2022 08:00) (71 - 88)  RR: 19 (01 Nov 2022 08:00) (12 - 22)  SpO2: 99% (01 Nov 2022 08:00) (97% - 99%)    Parameters below as of 01 Nov 2022 08:00  Patient On (Oxygen Delivery Method): room air            CONSTITUTIONAL: Non toxic appearing, disheveled female lying in bed  ENMT: Moist oral mucosa, no pharyngeal injection or exudates; poor dentition  RESPIRATORY: Normal respiratory effort; lungs are clear to auscultation bilaterally  CARDIOVASCULAR: Regular rate and rhythm, normal S1 and S2, no murmur/rub/gallop; No lower extremity edema; Peripheral pulses are 2+ bilaterally  ABDOMEN: Nontender to palpation, normoactive bowel sounds, no rebound/guarding; No hepatosplenomegaly  MUSCLOSKELETAL:  no clubbing or cyanosis of digits; no joint swelling or tenderness to palpation  PSYCH: A+O to person, place, and time; flat appropriate  NEUROLOGY: CN 2-12 are intact and symmetric; no gross sensory deficits;   SKIN: No rashes; no palpable lesions    LABS:                        13.4   7.18  )-----------( 111      ( 01 Nov 2022 06:17 )             37.9     11-01    131<L>  |  97  |  17.0  ----------------------------<  95  4.0   |  19.0<L>  |  0.65    Ca    8.6      01 Nov 2022 06:17  Phos  1.0     11-01  Mg     1.9     11-01    TPro  6.6  /  Alb  3.4  /  TBili  0.4  /  DBili  x   /  AST  36<H>  /  ALT  23  /  AlkPhos  58  11-01              CAPILLARY BLOOD GLUCOSE      POCT Blood Glucose.: 195 mg/dL (01 Nov 2022 11:03)  POCT Blood Glucose.: 140 mg/dL (01 Nov 2022 09:31)  POCT Blood Glucose.: 76 mg/dL (01 Nov 2022 08:00)  POCT Blood Glucose.: 87 mg/dL (01 Nov 2022 06:55)  POCT Blood Glucose.: 145 mg/dL (01 Nov 2022 04:55)        RADIOLOGY & ADDITIONAL TESTS:    11/1 CT Abd/Pel   VERTEBRAL BODY ANALYSIS: Low bone density as described above, consider   further workup for osteoporosis.

## 2022-11-01 NOTE — PATIENT PROFILE ADULT - FALL HARM RISK - FALL HARM RISK
short stature could present risk when patient is getting in and out of standard sized bed and chairs./Other

## 2022-11-01 NOTE — H&P ADULT - ASSESSMENT
66 y/o female pmhx DM presented to ER w/ weakness and decreased PO intake. Patient was seen in ER on 10/26 for similar symptoms w/ associated abdominal pain. Now being admitted for Euglycemic DKA 2/2 SGLT2 inhibitors and starvation ketosis.     Plan:  - Start Insulin infusion w/ q1hr Accu-Cheks  - Give additional 1L IVF. Start D5LR @ 150cc/hr   - Serial BMPs q4hr.   - Will transition to long acting insulin once AG closes.   - Check UA and lactate.  - Check HGA1c  - No evidence of infectious etiology. Observe of abx. CT abd/pelvis 10/26 w/out acute pathology  - COVID-19 positive, on room air. Asymptomatic. Hold off on decadron/ remdesivir.   - Lovenox for DVT ppx

## 2022-11-01 NOTE — ED ADULT NURSE NOTE - OBJECTIVE STATEMENT
pt is a 67y female c/o abdominal pain, per brother states she hasn't eaten in almost a week.  pt denies nausea, vomiting, diarrhea, constipation, sob, cp.  pt is aox4, ambulatory.  recently in ED for 10/26 for similar sxs, ct pelvis/abd was negative.

## 2022-11-01 NOTE — H&P ADULT - HISTORY OF PRESENT ILLNESS
68 y/o female pmhx DM presented to ER w/ weakness and decreased PO intake. Patient was seen in ER on 10/26 for similar symptoms w/ associated abdominal pain. CT abd/plevis done was negative for acute pathology. She admits to nausea w/ 1 episode of vomiting  Denies any fevers/chills, CP, SOB, diarrhea. In ER labs significant for bicarb 9, AG 27, small acetone. She was given 2L IVF. Meds reviewed, patient takes SGLT2 inhibitor. Concern for euglycemic DKA. Given additional 1L IVF and started on insulin infusion. Admitted to MICU.

## 2022-11-01 NOTE — CONSULT NOTE ADULT - NS ATTEND AMEND GEN_ALL_CORE FT
Lab evidence consistent with euglycemic DKA due to jardiance, which is now contraindicated.  Pt. likely has insulin deficiency; note report of "atrophic pancreas" on imaging. THis can further predispose to euglycemic DKA especially in the context of decreased oral intake and intercurrent illness.  SUspicion of osteoporosis on imaging can be addressed as outpatient.  Insulin indicated.

## 2022-11-02 LAB
A1C WITH ESTIMATED AVERAGE GLUCOSE RESULT: 6.8 % — HIGH (ref 4–5.6)
ALBUMIN SERPL ELPH-MCNC: 2.7 G/DL — LOW (ref 3.3–5.2)
ALBUMIN SERPL ELPH-MCNC: 3.1 G/DL — LOW (ref 3.3–5.2)
ALP SERPL-CCNC: 47 U/L — SIGNIFICANT CHANGE UP (ref 40–120)
ALP SERPL-CCNC: 51 U/L — SIGNIFICANT CHANGE UP (ref 40–120)
ALT FLD-CCNC: 16 U/L — SIGNIFICANT CHANGE UP
ALT FLD-CCNC: 19 U/L — SIGNIFICANT CHANGE UP
AMYLASE P1 CFR SERPL: 56 U/L — SIGNIFICANT CHANGE UP (ref 36–128)
ANION GAP SERPL CALC-SCNC: 10 MMOL/L — SIGNIFICANT CHANGE UP (ref 5–17)
APPEARANCE UR: CLEAR — SIGNIFICANT CHANGE UP
AST SERPL-CCNC: 26 U/L — SIGNIFICANT CHANGE UP
AST SERPL-CCNC: 34 U/L — HIGH
BACTERIA # UR AUTO: ABNORMAL
BASOPHILS # BLD AUTO: 0.01 K/UL — SIGNIFICANT CHANGE UP (ref 0–0.2)
BASOPHILS NFR BLD AUTO: 0.2 % — SIGNIFICANT CHANGE UP (ref 0–2)
BILIRUB SERPL-MCNC: 0.5 MG/DL — SIGNIFICANT CHANGE UP (ref 0.4–2)
BILIRUB SERPL-MCNC: 0.6 MG/DL — SIGNIFICANT CHANGE UP (ref 0.4–2)
BILIRUB UR-MCNC: NEGATIVE — SIGNIFICANT CHANGE UP
BUN SERPL-MCNC: 4.1 MG/DL — LOW (ref 8–20)
BUN SERPL-MCNC: 4.3 MG/DL — LOW (ref 8–20)
BUN SERPL-MCNC: 5 MG/DL — LOW (ref 8–20)
C PEPTIDE SERPL-MCNC: 1.3 NG/ML — SIGNIFICANT CHANGE UP (ref 1.1–4.4)
CALCIUM SERPL-MCNC: 7.4 MG/DL — LOW (ref 8.4–10.5)
CALCIUM SERPL-MCNC: 7.7 MG/DL — LOW (ref 8.4–10.5)
CALCIUM SERPL-MCNC: 7.8 MG/DL — LOW (ref 8.4–10.5)
CHLORIDE SERPL-SCNC: 101 MMOL/L — SIGNIFICANT CHANGE UP (ref 96–108)
CHLORIDE SERPL-SCNC: 101 MMOL/L — SIGNIFICANT CHANGE UP (ref 96–108)
CHLORIDE SERPL-SCNC: 102 MMOL/L — SIGNIFICANT CHANGE UP (ref 96–108)
CO2 SERPL-SCNC: 25 MMOL/L — SIGNIFICANT CHANGE UP (ref 22–29)
CO2 SERPL-SCNC: 25 MMOL/L — SIGNIFICANT CHANGE UP (ref 22–29)
CO2 SERPL-SCNC: 26 MMOL/L — SIGNIFICANT CHANGE UP (ref 22–29)
COLOR SPEC: YELLOW — SIGNIFICANT CHANGE UP
CREAT SERPL-MCNC: 0.33 MG/DL — LOW (ref 0.5–1.3)
CREAT SERPL-MCNC: 0.37 MG/DL — LOW (ref 0.5–1.3)
CREAT SERPL-MCNC: 0.44 MG/DL — LOW (ref 0.5–1.3)
DIFF PNL FLD: ABNORMAL
EGFR: 106 ML/MIN/1.73M2 — SIGNIFICANT CHANGE UP
EGFR: 110 ML/MIN/1.73M2 — SIGNIFICANT CHANGE UP
EGFR: 114 ML/MIN/1.73M2 — SIGNIFICANT CHANGE UP
EOSINOPHIL # BLD AUTO: 0.13 K/UL — SIGNIFICANT CHANGE UP (ref 0–0.5)
EOSINOPHIL NFR BLD AUTO: 2.6 % — SIGNIFICANT CHANGE UP (ref 0–6)
EPI CELLS # UR: SIGNIFICANT CHANGE UP
ESTIMATED AVERAGE GLUCOSE: 148 MG/DL — HIGH (ref 68–114)
GLUCOSE BLDC GLUCOMTR-MCNC: 118 MG/DL — HIGH (ref 70–99)
GLUCOSE BLDC GLUCOMTR-MCNC: 138 MG/DL — HIGH (ref 70–99)
GLUCOSE BLDC GLUCOMTR-MCNC: 201 MG/DL — HIGH (ref 70–99)
GLUCOSE BLDC GLUCOMTR-MCNC: 90 MG/DL — SIGNIFICANT CHANGE UP (ref 70–99)
GLUCOSE SERPL-MCNC: 155 MG/DL — HIGH (ref 70–99)
GLUCOSE SERPL-MCNC: 159 MG/DL — HIGH (ref 70–99)
GLUCOSE SERPL-MCNC: 208 MG/DL — HIGH (ref 70–99)
GLUCOSE UR QL: 1000 MG/DL
HCT VFR BLD CALC: 33.6 % — LOW (ref 34.5–45)
HCV AB S/CO SERPL IA: 0.06 S/CO — SIGNIFICANT CHANGE UP (ref 0–0.99)
HCV AB SERPL-IMP: SIGNIFICANT CHANGE UP
HGB BLD-MCNC: 12 G/DL — SIGNIFICANT CHANGE UP (ref 11.5–15.5)
IMM GRANULOCYTES NFR BLD AUTO: 0.8 % — SIGNIFICANT CHANGE UP (ref 0–0.9)
KETONES UR-MCNC: ABNORMAL
LACTATE BLDV-MCNC: 1.4 MMOL/L — SIGNIFICANT CHANGE UP (ref 0.5–2)
LEUKOCYTE ESTERASE UR-ACNC: NEGATIVE — SIGNIFICANT CHANGE UP
LIDOCAIN IGE QN: 33 U/L — SIGNIFICANT CHANGE UP (ref 22–51)
LIDOCAIN IGE QN: 35 U/L — SIGNIFICANT CHANGE UP (ref 22–51)
LIDOCAIN IGE QN: 35 U/L — SIGNIFICANT CHANGE UP (ref 22–51)
LYMPHOCYTES # BLD AUTO: 0.86 K/UL — LOW (ref 1–3.3)
LYMPHOCYTES # BLD AUTO: 17.1 % — SIGNIFICANT CHANGE UP (ref 13–44)
MAGNESIUM SERPL-MCNC: 1.7 MG/DL — SIGNIFICANT CHANGE UP (ref 1.6–2.6)
MAGNESIUM SERPL-MCNC: 1.9 MG/DL — SIGNIFICANT CHANGE UP (ref 1.6–2.6)
MCHC RBC-ENTMCNC: 30.5 PG — SIGNIFICANT CHANGE UP (ref 27–34)
MCHC RBC-ENTMCNC: 35.7 GM/DL — SIGNIFICANT CHANGE UP (ref 32–36)
MCV RBC AUTO: 85.3 FL — SIGNIFICANT CHANGE UP (ref 80–100)
MONOCYTES # BLD AUTO: 0.42 K/UL — SIGNIFICANT CHANGE UP (ref 0–0.9)
MONOCYTES NFR BLD AUTO: 8.3 % — SIGNIFICANT CHANGE UP (ref 2–14)
NEUTROPHILS # BLD AUTO: 3.57 K/UL — SIGNIFICANT CHANGE UP (ref 1.8–7.4)
NEUTROPHILS NFR BLD AUTO: 71 % — SIGNIFICANT CHANGE UP (ref 43–77)
NITRITE UR-MCNC: NEGATIVE — SIGNIFICANT CHANGE UP
PH UR: 6.5 — SIGNIFICANT CHANGE UP (ref 5–8)
PHOSPHATE SERPL-MCNC: 1.1 MG/DL — LOW (ref 2.4–4.7)
PHOSPHATE SERPL-MCNC: 1.7 MG/DL — LOW (ref 2.4–4.7)
PLATELET # BLD AUTO: 80 K/UL — LOW (ref 150–400)
POTASSIUM SERPL-MCNC: 2.7 MMOL/L — CRITICAL LOW (ref 3.5–5.3)
POTASSIUM SERPL-MCNC: 4.1 MMOL/L — SIGNIFICANT CHANGE UP (ref 3.5–5.3)
POTASSIUM SERPL-MCNC: 4.2 MMOL/L — SIGNIFICANT CHANGE UP (ref 3.5–5.3)
POTASSIUM SERPL-SCNC: 2.7 MMOL/L — CRITICAL LOW (ref 3.5–5.3)
POTASSIUM SERPL-SCNC: 4.1 MMOL/L — SIGNIFICANT CHANGE UP (ref 3.5–5.3)
POTASSIUM SERPL-SCNC: 4.2 MMOL/L — SIGNIFICANT CHANGE UP (ref 3.5–5.3)
PROT SERPL-MCNC: 5.1 G/DL — LOW (ref 6.6–8.7)
PROT SERPL-MCNC: 5.9 G/DL — LOW (ref 6.6–8.7)
PROT UR-MCNC: NEGATIVE — SIGNIFICANT CHANGE UP
RBC # BLD: 3.94 M/UL — SIGNIFICANT CHANGE UP (ref 3.8–5.2)
RBC # FLD: 13.1 % — SIGNIFICANT CHANGE UP (ref 10.3–14.5)
RBC CASTS # UR COMP ASSIST: SIGNIFICANT CHANGE UP /HPF (ref 0–4)
SODIUM SERPL-SCNC: 136 MMOL/L — SIGNIFICANT CHANGE UP (ref 135–145)
SODIUM SERPL-SCNC: 137 MMOL/L — SIGNIFICANT CHANGE UP (ref 135–145)
SODIUM SERPL-SCNC: 137 MMOL/L — SIGNIFICANT CHANGE UP (ref 135–145)
SP GR SPEC: 1.01 — SIGNIFICANT CHANGE UP (ref 1.01–1.02)
T4 AB SER-ACNC: 4.8 UG/DL — SIGNIFICANT CHANGE UP (ref 4.5–12)
TSH SERPL-MCNC: 1.12 UIU/ML — SIGNIFICANT CHANGE UP (ref 0.27–4.2)
UROBILINOGEN FLD QL: NEGATIVE MG/DL — SIGNIFICANT CHANGE UP
WBC # BLD: 5.03 K/UL — SIGNIFICANT CHANGE UP (ref 3.8–10.5)
WBC # FLD AUTO: 5.03 K/UL — SIGNIFICANT CHANGE UP (ref 3.8–10.5)
WBC UR QL: SIGNIFICANT CHANGE UP /HPF (ref 0–5)

## 2022-11-02 PROCEDURE — 76705 ECHO EXAM OF ABDOMEN: CPT | Mod: 26

## 2022-11-02 PROCEDURE — 74176 CT ABD & PELVIS W/O CONTRAST: CPT | Mod: 26

## 2022-11-02 PROCEDURE — 99232 SBSQ HOSP IP/OBS MODERATE 35: CPT

## 2022-11-02 PROCEDURE — 99233 SBSQ HOSP IP/OBS HIGH 50: CPT

## 2022-11-02 RX ORDER — INSULIN LISPRO 100/ML
VIAL (ML) SUBCUTANEOUS
Refills: 0 | Status: DISCONTINUED | OUTPATIENT
Start: 2022-11-02 | End: 2022-11-02

## 2022-11-02 RX ORDER — POTASSIUM CHLORIDE 20 MEQ
40 PACKET (EA) ORAL EVERY 4 HOURS
Refills: 0 | Status: DISCONTINUED | OUTPATIENT
Start: 2022-11-02 | End: 2022-11-02

## 2022-11-02 RX ORDER — ONDANSETRON 8 MG/1
4 TABLET, FILM COATED ORAL ONCE
Refills: 0 | Status: DISCONTINUED | OUTPATIENT
Start: 2022-11-02 | End: 2022-11-04

## 2022-11-02 RX ORDER — POTASSIUM CHLORIDE 20 MEQ
40 PACKET (EA) ORAL EVERY 4 HOURS
Refills: 0 | Status: COMPLETED | OUTPATIENT
Start: 2022-11-02 | End: 2022-11-02

## 2022-11-02 RX ORDER — POTASSIUM PHOSPHATE, MONOBASIC POTASSIUM PHOSPHATE, DIBASIC 236; 224 MG/ML; MG/ML
30 INJECTION, SOLUTION INTRAVENOUS ONCE
Refills: 0 | Status: COMPLETED | OUTPATIENT
Start: 2022-11-02 | End: 2022-11-02

## 2022-11-02 RX ORDER — SODIUM CHLORIDE 9 MG/ML
1000 INJECTION, SOLUTION INTRAVENOUS
Refills: 0 | Status: DISCONTINUED | OUTPATIENT
Start: 2022-11-02 | End: 2022-11-03

## 2022-11-02 RX ORDER — INSULIN LISPRO 100/ML
VIAL (ML) SUBCUTANEOUS
Refills: 0 | Status: DISCONTINUED | OUTPATIENT
Start: 2022-11-02 | End: 2022-11-04

## 2022-11-02 RX ADMIN — Medication 40 MILLIEQUIVALENT(S): at 21:47

## 2022-11-02 RX ADMIN — INSULIN GLARGINE 8 UNIT(S): 100 INJECTION, SOLUTION SUBCUTANEOUS at 09:17

## 2022-11-02 RX ADMIN — Medication 3 UNIT(S): at 07:37

## 2022-11-02 RX ADMIN — SODIUM CHLORIDE 150 MILLILITER(S): 9 INJECTION, SOLUTION INTRAVENOUS at 04:47

## 2022-11-02 RX ADMIN — SODIUM CHLORIDE 75 MILLILITER(S): 9 INJECTION, SOLUTION INTRAVENOUS at 19:56

## 2022-11-02 RX ADMIN — ATORVASTATIN CALCIUM 20 MILLIGRAM(S): 80 TABLET, FILM COATED ORAL at 21:48

## 2022-11-02 RX ADMIN — POTASSIUM PHOSPHATE, MONOBASIC POTASSIUM PHOSPHATE, DIBASIC 83.33 MILLIMOLE(S): 236; 224 INJECTION, SOLUTION INTRAVENOUS at 09:17

## 2022-11-02 RX ADMIN — SODIUM CHLORIDE 150 MILLILITER(S): 9 INJECTION, SOLUTION INTRAVENOUS at 07:37

## 2022-11-02 RX ADMIN — Medication 40 MILLIEQUIVALENT(S): at 15:35

## 2022-11-02 RX ADMIN — Medication 3 UNIT(S): at 11:49

## 2022-11-02 RX ADMIN — CHLORHEXIDINE GLUCONATE 1 APPLICATION(S): 213 SOLUTION TOPICAL at 12:43

## 2022-11-02 NOTE — PROGRESS NOTE ADULT - NS ATTEND AMEND GEN_ALL_CORE FT
Quality 111:Pneumonia Vaccination Status For Older Adults: Pneumococcal Vaccination not Administered or Previously Received, Reason not Otherwise Specified Quality 110: Preventive Care And Screening: Influenza Immunization: Influenza Immunization previously received during influenza season Quality 226: Preventive Care And Screening: Tobacco Use: Screening And Cessation Intervention: Patient screened for tobacco use and is an ex/non-smoker reasonable to continue januvia upon discharge, jardiance to be stopped. Should glucose levels rise I future consider a short acting secretagogue like prandin pre-meal; can be facilitated as an outpatient. Detail Level: Detailed

## 2022-11-02 NOTE — PROGRESS NOTE ADULT - ASSESSMENT
Patient is a 67 year old female with DM (on Jardiance) admitted to ICU for DKA. Started on insulin ggt with improvement in AG. Now transferred to Medicine for further management. Complaining of abdominal pain today. LFTs WNL. CT abdomen with concerns for acute cholecytitis, but US was negative.    Euglycemic DKA   sugars labile  continue lantus and hold premeal ademalog  continue ISS  diabetic teaching  f/u Cpeptide/KAMRYN   avoid jardiance    endo recs appreciated     Abdominal Pain; unclear etiology  -CT abdomen with concerns for acute choley  -RUQ sono negative for evidence of acute choley  -LFTS WNL  -check lipase  -serial abdominal exam    Covid 19 infection  Asymptomatic; saturating well on room air  Continue isolation precautions    HLD  continue atorvastatin      Hypokalemia and Hypophosphatemia due to poor intake  -replete potassium and phos  -repeat BMP this afternnon    DVT ppx : Lovenox SC    Dispo - Diabetic teaching. Negative abdominal pain work up. Anticipate discharge in 24 hours if sugars are stable.

## 2022-11-02 NOTE — PROGRESS NOTE ADULT - SUBJECTIVE AND OBJECTIVE BOX
CHIEF COMPLAINT/INTERVAL HISTORY:    Patient is a 67y old  Female who presents with a chief complaint of DKA (2022 14:24)      HPI:  68 y/o female pmhx DM presented to ER w/ weakness and decreased PO intake. Patient was seen in ER on 10/26 for similar symptoms w/ associated abdominal pain. CT abd/plevis done was negative for acute pathology. She admits to nausea w/ 1 episode of vomiting  Denies any fevers/chills, CP, SOB, diarrhea. In ER labs significant for bicarb 9, AG 27, small acetone. She was given 2L IVF. Meds reviewed, patient takes SGLT2 inhibitor. Concern for euglycemic DKA. Given additional 1L IVF and started on insulin infusion. Admitted to MICU.  (2022 04:03)      SUBJECTIVE & OBJECTIVE: Pt seen and examined at bedside.     ICU Vital Signs Last 24 Hrs  T(C): 37.2 (2022 12:17), Max: 37.2 (2022 12:17)  T(F): 98.9 (2022 12:17), Max: 98.9 (2022 12:17)  HR: 92 (2022 12:17) (92 - 102)  BP: 106/59 (2022 12:17) (98/66 - 106/59)  BP(mean): --  ABP: --  ABP(mean): --  RR: 18 (2022 12:17) (18 - 18)  SpO2: 100% (2022 12:17) (97% - 100%)    O2 Parameters below as of 2022 12:17  Patient On (Oxygen Delivery Method): room air              MEDICATIONS  (STANDING):  atorvastatin 20 milliGRAM(s) Oral at bedtime  chlorhexidine 4% Liquid 1 Application(s) Topical <User Schedule>  dextrose 50% Injectable 50 milliLiter(s) IV Push every 15 minutes  influenza  Vaccine (HIGH DOSE) 0.7 milliLiter(s) IntraMuscular once  insulin glargine Injectable (LANTUS) 8 Unit(s) SubCutaneous every morning  insulin lispro (ADMELOG) corrective regimen sliding scale   SubCutaneous three times a day before meals  potassium chloride   Powder 40 milliEquivalent(s) Oral every 4 hours    MEDICATIONS  (PRN):      LABS:                        12.0   5.03  )-----------( 80       ( 2022 06:00 )             33.6     11    137  |  102  |  5.0<L>  ----------------------------<  208<H>  2.7<LL>   |  25.0  |  0.44<L>    Ca    7.4<L>      2022 06:00  Phos  1.1     11  Mg     1.9         TPro  5.1<L>  /  Alb  2.7<L>  /  TBili  0.5  /  DBili  x   /  AST  26  /  ALT  16  /  AlkPhos  47  11      Urinalysis Basic - ( 2022 09:36 )    Color: Yellow / Appearance: Clear / S.010 / pH: x  Gluc: x / Ketone: Small  / Bili: Negative / Urobili: Negative mg/dL   Blood: x / Protein: Negative / Nitrite: Negative   Leuk Esterase: Negative / RBC: 0-2 /HPF / WBC 0-2 /HPF   Sq Epi: x / Non Sq Epi: Occasional / Bacteria: Occasional        CAPILLARY BLOOD GLUCOSE      POCT Blood Glucose.: 118 mg/dL (2022 11:45)  POCT Blood Glucose.: 201 mg/dL (2022 07:35)  POCT Blood Glucose.: 89 mg/dL (2022 21:46)  POCT Blood Glucose.: 94 mg/dL (2022 16:40)      RECENT CULTURES:      RADIOLOGY & ADDITIONAL TESTS:      PHYSICAL EXAM:    GENERAL: NAD, well-groomed, well-developed  HEAD:  Atraumatic, Normocephalic  EYES: EOMI, PERRLA, conjunctiva and sclera clear  ENMT: Moist mucous membranes  NECK: Supple, No JVD  NERVOUS SYSTEM:  Alert & Oriented X3, Motor Strength 5/5 B/L upper and lower extremities; DTRs 2+ intact and symmetric  CHEST/LUNG: Clear to auscultation bilaterally; No rales, rhonchi, wheezing, or rubs  HEART: Regular rate and rhythm; No murmurs, rubs, or gallops  ABDOMEN: Soft, Nontender, Nondistended; Bowel sounds present  EXTREMITIES:  2+ Peripheral Pulses, No clubbing, cyanosis, or edema        DVT/GI ppx  Discussed with pt @ bedside CHIEF COMPLAINT/INTERVAL HISTORY:    Patient is a 67y old  Female who presents with a chief complaint of DKA (2022 14:24)    SUBJECTIVE & OBJECTIVE: Pt seen and examined at bedside. No overnight events. Sugars labile. Complaining of abdominal pain. CT abdomen ordered.    ROS: No chest pain, palpitations, SOB, light headedness, dizziness, headache, nausea/vomiting, fevers/chills,, dysuria or increased urinary frequency.    ICU Vital Signs Last 24 Hrs  T(C): 37.2 (2022 12:17), Max: 37.2 (:)  T(F): 98.9 (:), Max: 98.9 (:)  HR: 92 (:) (92 - 102)  BP: 106/59 (:) (98/66 - 106/59)  BP(mean): --  ABP: --  ABP(mean): --  RR: 18 (:) (18 - 18)  SpO2: 100% (:17) (97% - 100%)    O2 Parameters below as of :17  Patient On (Oxygen Delivery Method): room air              MEDICATIONS  (STANDING):  atorvastatin 20 milliGRAM(s) Oral at bedtime  chlorhexidine 4% Liquid 1 Application(s) Topical <User Schedule>  dextrose 50% Injectable 50 milliLiter(s) IV Push every 15 minutes  influenza  Vaccine (HIGH DOSE) 0.7 milliLiter(s) IntraMuscular once  insulin glargine Injectable (LANTUS) 8 Unit(s) SubCutaneous every morning  insulin lispro (ADMELOG) corrective regimen sliding scale   SubCutaneous three times a day before meals  potassium chloride   Powder 40 milliEquivalent(s) Oral every 4 hours    MEDICATIONS  (PRN):      LABS:                        12.0   5.03  )-----------( 80       ( 2022 06:00 )             33.6     11-02    137  |  102  |  5.0<L>  ----------------------------<  208<H>  2.7<LL>   |  25.0  |  0.44<L>    Ca    7.4<L>      2022 06:00  Phos  1.1       Mg     1.9         TPro  5.1<L>  /  Alb  2.7<L>  /  TBili  0.5  /  DBili  x   /  AST  26  /  ALT  16  /  AlkPhos  47        Urinalysis Basic - ( 2022 09:36 )    Color: Yellow / Appearance: Clear / S.010 / pH: x  Gluc: x / Ketone: Small  / Bili: Negative / Urobili: Negative mg/dL   Blood: x / Protein: Negative / Nitrite: Negative   Leuk Esterase: Negative / RBC: 0-2 /HPF / WBC 0-2 /HPF   Sq Epi: x / Non Sq Epi: Occasional / Bacteria: Occasional        CAPILLARY BLOOD GLUCOSE      POCT Blood Glucose.: 118 mg/dL (2022 11:45)  POCT Blood Glucose.: 201 mg/dL (2022 07:35)  POCT Blood Glucose.: 89 mg/dL (2022 21:46)  POCT Blood Glucose.: 94 mg/dL (2022 16:40)      RECENT CULTURES:      RADIOLOGY & ADDITIONAL TESTS:      PHYSICAL EXAM:    GENERAL: elderly female, laying in bed, NAD  HEAD:  Atraumatic, Normocephalic  EYES: EOMI, PERRLA, conjunctiva and sclera clear  ENMT: Moist mucous membranes  NECK: Supple  NERVOUS SYSTEM:  Alert & Oriented X3, no focal deficits  CHEST/LUNG: Clear to auscultation bilaterally; No rales, rhonchi, wheezing, or rubs  HEART: Regular rate and rhythm; No murmurs, rubs, or gallops  ABDOMEN: Soft, generalized tenderness, distended; Bowel sounds present  EXTREMITIES:  no pedal edema

## 2022-11-02 NOTE — PROGRESS NOTE ADULT - ASSESSMENT
66 y/o female pmhx DM presented to ER with weakness and decreased PO intake. Patient was seen in ER on 10/26 for similar symptoms w/ associated abdominal pain. In ER labs significant for bicarb 9, AG 27, small acetone. Patient takes SGLT2 inhibitor which caused euglycemic DKA.    DM hx: Spoke with pts brother. Pt has mild MR so does not know much about her medications/history. She lives with her brother who takes care of all her medications. She was previously taking Januvia 100mg and Jardiance 10mg daily.     1. Controlled T2DM, a1c 6.8%  - Continue lantus 8 units qhs  - Standing admelog discontinued as FS were running low yesterday  - Moderate sliding scale coverage ordered  - Will likely discharge on Januvia alone or with an additional oral agent  - Do not resume Jardiance as it caused euglycemic DKA  - Cpeptide/KAMRYN pending, robina has T2DM  - TFTs within normal limits    2. COVID - Continue supportive measures/isolation precautions.

## 2022-11-02 NOTE — PROGRESS NOTE ADULT - SUBJECTIVE AND OBJECTIVE BOX
INTERVAL EVENTS:  Follow up diabetes management     ROS: Patient denies chest pain, SOB, abd pain, N/V.    MEDICATIONS  (STANDING):  atorvastatin 20 milliGRAM(s) Oral at bedtime  chlorhexidine 4% Liquid 1 Application(s) Topical <User Schedule>  dextrose 50% Injectable 50 milliLiter(s) IV Push every 15 minutes  influenza  Vaccine (HIGH DOSE) 0.7 milliLiter(s) IntraMuscular once  insulin glargine Injectable (LANTUS) 8 Unit(s) SubCutaneous every morning  insulin lispro (ADMELOG) corrective regimen sliding scale   SubCutaneous three times a day before meals  potassium chloride    Tablet ER 40 milliEquivalent(s) Oral every 4 hours    Allergies  Jardiance (Unknown)    Vital Signs Last 24 Hrs  T(C): 37.2 (2022 12:17), Max: 37.2 (2022 12:17)  T(F): 98.9 (:17), Max: 98.9 (2022 12:)  HR: 92 (2022 12:) (90 - 104)  BP: 106/59 (2022 12:17) (98/66 - 106/59)  BP(mean): 96 (2022 15:00) (96 - 96)  RR: 18 (2022 12:17) (14 - 22)  SpO2: 100% (2022 12:17) (97% - 100%)    Parameters below as of 2022 12:17  Patient On (Oxygen Delivery Method): room air      PHYSICAL EXAM:  General: No apparent distress  Neck: Supple, trachea midline, no thyromegaly  Respiratory: Lungs clear bilaterally, normal rate, effort  Cardiac: +S1, S2, no m/r/g  GI: +BS, soft, non tender, non distended  Extremities: No peripheral edema, no pedal lesions  Neuro: Alert, can be slow to respond      LABS:                        12.0   5.03  )-----------( 80       ( 2022 06:00 )             33.6     11-    137  |  102  |  5.0<L>  ----------------------------<  208<H>  2.7<LL>   |  25.0  |  0.44<L>    Ca    7.4<L>      2022 06:00  Phos  1.1       Mg     1.9         TPro  5.1<L>  /  Alb  2.7<L>  /  TBili  0.5  /  DBili  x   /  AST  26  /  ALT  16  /  AlkPhos  47      Urinalysis Basic - ( 2022 09:36 )    Color: Yellow / Appearance: Clear / S.010 / pH: x  Gluc: x / Ketone: Small  / Bili: Negative / Urobili: Negative mg/dL   Blood: x / Protein: Negative / Nitrite: Negative   Leuk Esterase: Negative / RBC: 0-2 /HPF / WBC 0-2 /HPF   Sq Epi: x / Non Sq Epi: Occasional / Bacteria: Occasional      POCT Blood Glucose.: 118 mg/dL (22 @ 11:45)  POCT Blood Glucose.: 201 mg/dL (22 @ 07:35)  POCT Blood Glucose.: 89 mg/dL (22 @ 21:46)  POCT Blood Glucose.: 94 mg/dL (22 @ 16:40)    Thyroid Stimulating Hormone, Serum: 1.12 uIU/mL (22 @ 06:00)

## 2022-11-03 DIAGNOSIS — R10.9 UNSPECIFIED ABDOMINAL PAIN: ICD-10-CM

## 2022-11-03 LAB
ANION GAP SERPL CALC-SCNC: 10 MMOL/L — SIGNIFICANT CHANGE UP (ref 5–17)
BUN SERPL-MCNC: <3 MG/DL — LOW (ref 8–20)
CALCIUM SERPL-MCNC: 7.5 MG/DL — LOW (ref 8.4–10.5)
CHLORIDE SERPL-SCNC: 102 MMOL/L — SIGNIFICANT CHANGE UP (ref 96–108)
CO2 SERPL-SCNC: 27 MMOL/L — SIGNIFICANT CHANGE UP (ref 22–29)
CREAT SERPL-MCNC: 0.33 MG/DL — LOW (ref 0.5–1.3)
CULTURE RESULTS: SIGNIFICANT CHANGE UP
EGFR: 114 ML/MIN/1.73M2 — SIGNIFICANT CHANGE UP
GLUCOSE BLDC GLUCOMTR-MCNC: 104 MG/DL — HIGH (ref 70–99)
GLUCOSE BLDC GLUCOMTR-MCNC: 137 MG/DL — HIGH (ref 70–99)
GLUCOSE BLDC GLUCOMTR-MCNC: 156 MG/DL — HIGH (ref 70–99)
GLUCOSE BLDC GLUCOMTR-MCNC: 79 MG/DL — SIGNIFICANT CHANGE UP (ref 70–99)
GLUCOSE SERPL-MCNC: 179 MG/DL — HIGH (ref 70–99)
HCT VFR BLD CALC: 35.5 % — SIGNIFICANT CHANGE UP (ref 34.5–45)
HGB BLD-MCNC: 12.4 G/DL — SIGNIFICANT CHANGE UP (ref 11.5–15.5)
MCHC RBC-ENTMCNC: 30.5 PG — SIGNIFICANT CHANGE UP (ref 27–34)
MCHC RBC-ENTMCNC: 34.9 GM/DL — SIGNIFICANT CHANGE UP (ref 32–36)
MCV RBC AUTO: 87.4 FL — SIGNIFICANT CHANGE UP (ref 80–100)
PHOSPHATE SERPL-MCNC: 4.3 MG/DL — SIGNIFICANT CHANGE UP (ref 2.4–4.7)
PLATELET # BLD AUTO: 75 K/UL — LOW (ref 150–400)
POTASSIUM SERPL-MCNC: 4.2 MMOL/L — SIGNIFICANT CHANGE UP (ref 3.5–5.3)
POTASSIUM SERPL-SCNC: 4.2 MMOL/L — SIGNIFICANT CHANGE UP (ref 3.5–5.3)
RBC # BLD: 4.06 M/UL — SIGNIFICANT CHANGE UP (ref 3.8–5.2)
RBC # FLD: 13.4 % — SIGNIFICANT CHANGE UP (ref 10.3–14.5)
SODIUM SERPL-SCNC: 139 MMOL/L — SIGNIFICANT CHANGE UP (ref 135–145)
SPECIMEN SOURCE: SIGNIFICANT CHANGE UP
WBC # BLD: 4.86 K/UL — SIGNIFICANT CHANGE UP (ref 3.8–10.5)
WBC # FLD AUTO: 4.86 K/UL — SIGNIFICANT CHANGE UP (ref 3.8–10.5)

## 2022-11-03 PROCEDURE — 99223 1ST HOSP IP/OBS HIGH 75: CPT

## 2022-11-03 PROCEDURE — 99232 SBSQ HOSP IP/OBS MODERATE 35: CPT

## 2022-11-03 RX ORDER — CEFOTETAN DISODIUM 1 G
2 VIAL (EA) INJECTION ONCE
Refills: 0 | Status: COMPLETED | OUTPATIENT
Start: 2022-11-03 | End: 2022-11-03

## 2022-11-03 RX ORDER — MORPHINE SULFATE 50 MG/1
1 CAPSULE, EXTENDED RELEASE ORAL EVERY 6 HOURS
Refills: 0 | Status: DISCONTINUED | OUTPATIENT
Start: 2022-11-03 | End: 2022-11-04

## 2022-11-03 RX ORDER — SODIUM CHLORIDE 9 MG/ML
1000 INJECTION INTRAMUSCULAR; INTRAVENOUS; SUBCUTANEOUS
Refills: 0 | Status: DISCONTINUED | OUTPATIENT
Start: 2022-11-03 | End: 2022-11-03

## 2022-11-03 RX ORDER — CEFOTETAN DISODIUM 1 G
VIAL (EA) INJECTION
Refills: 0 | Status: DISCONTINUED | OUTPATIENT
Start: 2022-11-03 | End: 2022-11-04

## 2022-11-03 RX ORDER — ACETAMINOPHEN 500 MG
650 TABLET ORAL EVERY 6 HOURS
Refills: 0 | Status: DISCONTINUED | OUTPATIENT
Start: 2022-11-03 | End: 2022-11-04

## 2022-11-03 RX ORDER — MORPHINE SULFATE 50 MG/1
2 CAPSULE, EXTENDED RELEASE ORAL EVERY 6 HOURS
Refills: 0 | Status: DISCONTINUED | OUTPATIENT
Start: 2022-11-03 | End: 2022-11-04

## 2022-11-03 RX ORDER — CEFOTETAN DISODIUM 1 G
2 VIAL (EA) INJECTION EVERY 12 HOURS
Refills: 0 | Status: DISCONTINUED | OUTPATIENT
Start: 2022-11-04 | End: 2022-11-04

## 2022-11-03 RX ORDER — SODIUM CHLORIDE 9 MG/ML
1000 INJECTION, SOLUTION INTRAVENOUS
Refills: 0 | Status: DISCONTINUED | OUTPATIENT
Start: 2022-11-03 | End: 2022-11-04

## 2022-11-03 RX ADMIN — SODIUM CHLORIDE 100 MILLILITER(S): 9 INJECTION INTRAMUSCULAR; INTRAVENOUS; SUBCUTANEOUS at 16:54

## 2022-11-03 RX ADMIN — CHLORHEXIDINE GLUCONATE 1 APPLICATION(S): 213 SOLUTION TOPICAL at 13:08

## 2022-11-03 RX ADMIN — Medication 100 GRAM(S): at 16:16

## 2022-11-03 RX ADMIN — Medication 2: at 08:36

## 2022-11-03 RX ADMIN — SODIUM CHLORIDE 75 MILLILITER(S): 9 INJECTION, SOLUTION INTRAVENOUS at 04:51

## 2022-11-03 RX ADMIN — INSULIN GLARGINE 8 UNIT(S): 100 INJECTION, SOLUTION SUBCUTANEOUS at 08:35

## 2022-11-03 RX ADMIN — SODIUM CHLORIDE 75 MILLILITER(S): 9 INJECTION, SOLUTION INTRAVENOUS at 13:09

## 2022-11-03 RX ADMIN — SODIUM CHLORIDE 75 MILLILITER(S): 9 INJECTION, SOLUTION INTRAVENOUS at 20:39

## 2022-11-03 RX ADMIN — POTASSIUM PHOSPHATE, MONOBASIC POTASSIUM PHOSPHATE, DIBASIC 83.33 MILLIMOLE(S): 236; 224 INJECTION, SOLUTION INTRAVENOUS at 00:32

## 2022-11-03 NOTE — CONSULT NOTE ADULT - NS ATTEND AMEND GEN_ALL_CORE FT
I evaluated this pt. with my NP and agree with the above assessment and management plan. Pt has exquisite RUQ tenderness to palpation on PE with CT suggestive of acute cholecystitis. Abdominal sonogram not suggestive of cholecystitis. HIDA scan recommended to r/o acute cholecystitis as well as IV Cefotetan to Rx possible acute cholecystitis. Repeat labs ordered for the AM. Case and management d/w Dr. Monique after seeing the pt. this afternoon. I evaluated this pt. with my NP and agree with the above assessment and management plan. Pt has exquisite RUQ tenderness to palpation on PE with CT suggestive of acute cholecystitis. Abdominal sonogram not suggestive of cholecystitis. HIDA scan recommended to r/o acute cholecystitis as well as IV Cefotetan to Rx possible acute cholecystitis. Surgical consult. Repeat labs ordered for the AM. Case and management d/w Dr. Monique after seeing the pt. this afternoon. Keep NPO for now.

## 2022-11-03 NOTE — DIETITIAN INITIAL EVALUATION ADULT - OTHER INFO
Patient is a 67 year old female with DM (on Jardiance) admitted to ICU for DKA. Started on insulin ggt with improvement in AG. Now transferred to Medicine for further management. Complaining of abdominal pain today. LFTs WNL. CT abdomen with concerns for acute cholecytitis, but US was negative.

## 2022-11-03 NOTE — DIETITIAN INITIAL EVALUATION ADULT - ORAL INTAKE PTA/DIET HISTORY
Pt admitted with abd pain, decreased po intake, nausea and vomiting. Diet to be advanced this am to clears and advance as tolerated. Past weights noted 127# 2014, 93# 10/2022, 10/31 100#. Pt not a candidate for diet education at this time.  Pt admitted with abd pain, decreased po intake, nausea and vomiting. Diet to be advanced this am to clears and advance as tolerated. Past weights noted 127# 2014, 93# 10/2022, 10/31 100#. Pt not a candidate for diet education at this time. Pt with mild MR as per MD note.

## 2022-11-03 NOTE — PROGRESS NOTE ADULT - SUBJECTIVE AND OBJECTIVE BOX
CC: Starvation, initial encounter     (2022 10:11)    HPI:  66 y/o female with DM admitted to ICU with DKA after presenting with abd pain. Incidentally found to have Covid 19.     INTERVAL HPI/OVERNIGHT EVENTS:  Patient seen and examined with brother at bedside this am.  Patient complaining of abdominal pain.  Patient denies any further complaints.      Vital Signs Last 24 Hrs  T(C): 36.9 (2022 10:53), Max: 37.1 (2022 21:39)  T(F): 98.4 (2022 10:53), Max: 98.7 (2022 21:39)  HR: 86 (2022 10:53) (86 - 91)  BP: 109/74 (2022 10:53) (109/74 - 122/85)  BP(mean): --  RR: 18 (2022 10:53) (18 - 18)  SpO2: 99% (2022 10:53) (95% - 99%)    Parameters below as of 2022 10:53  Patient On (Oxygen Delivery Method): room air      I&O's Detail    2022 07:01  -  2022 07:00  --------------------------------------------------------  IN:    dextrose 5% + lactated ringers: 1800 mL  Total IN: 1800 mL    OUT:  Total OUT: 0 mL    Total NET: 1800 mL      PHYSICAL EXAM:  GENERAL: NAD  HEAD:  Atraumatic, Normocephalic  NECK: Supple, No JVD, Normal thyroid  NERVOUS SYSTEM:  Alert & Oriented X3, Good concentration; Motor Strength 5/5 B/L upper and lower extremities  CHEST/LUNG: Clear to auscultation bilaterally  HEART: Regular rate and rhythm; No murmurs, rubs, or gallops  ABDOMEN: Soft, Nontender, Nondistended; Bowel sounds present  EXTREMITIES:  2+ Peripheral Pulses, No clubbing, cyanosis, or edema                                12.4   4.86  )-----------( 75       ( 2022 06:00 )             35.5     2022 06:00    139    |  102    |  <3.0   ----------------------------<  179    4.2     |  27.0   |  0.33     Ca    7.5        2022 06:00  Phos  4.3       2022 06:00  Mg     1.7       2022 22:50    TPro  5.9    /  Alb  3.1    /  TBili  0.6    /  DBili  x      /  AST  34     /  ALT  19     /  AlkPhos  51     2022 22:50      CAPILLARY BLOOD GLUCOSE  POCT Blood Glucose.: 137 mg/dL (2022 11:28)  POCT Blood Glucose.: 156 mg/dL (2022 08:32)  POCT Blood Glucose.: 138 mg/dL (2022 21:46)  POCT Blood Glucose.: 90 mg/dL (2022 17:36)    LIVER FUNCTIONS - ( 2022 22:50 )  Alb: 3.1 g/dL / Pro: 5.9 g/dL / ALK PHOS: 51 U/L / ALT: 19 U/L / AST: 34 U/L / GGT: x           Urinalysis Basic - ( 2022 09:36 )    Color: Yellow / Appearance: Clear / S.010 / pH: x  Gluc: x / Ketone: Small  / Bili: Negative / Urobili: Negative mg/dL   Blood: x / Protein: Negative / Nitrite: Negative   Leuk Esterase: Negative / RBC: 0-2 /HPF / WBC 0-2 /HPF   Sq Epi: x / Non Sq Epi: Occasional / Bacteria: Occasional        MEDICATIONS  (STANDING):  atorvastatin 20 milliGRAM(s) Oral at bedtime  cefoTEtan  IVPB 2 Gram(s) IV Intermittent once  cefoTEtan  IVPB      chlorhexidine 4% Liquid 1 Application(s) Topical <User Schedule>  dextrose 5% + lactated ringers. 1000 milliLiter(s) (75 mL/Hr) IV Continuous <Continuous>  dextrose 50% Injectable 50 milliLiter(s) IV Push every 15 minutes  influenza  Vaccine (HIGH DOSE) 0.7 milliLiter(s) IntraMuscular once  insulin glargine Injectable (LANTUS) 8 Unit(s) SubCutaneous every morning  insulin lispro (ADMELOG) corrective regimen sliding scale   SubCutaneous three times a day before meals  sodium chloride 0.9%. 1000 milliLiter(s) (100 mL/Hr) IV Continuous <Continuous>    MEDICATIONS  (PRN):  acetaminophen     Tablet .. 650 milliGRAM(s) Oral every 6 hours PRN Mild Pain (1 - 3)  ondansetron Injectable 4 milliGRAM(s) IV Push once PRN Nausea      RADIOLOGY & ADDITIONAL TESTS:  < from: CT Abdomen and Pelvis No Cont (22 @ 17:54) >  ACC: [id number] EXAM:  CT ABDOMEN AND PELVIS                          PROCEDURE DATE:  2022          INTERPRETATION:  CLINICAL INFORMATION: Abdominal pain.    COMPARISON: Contrast-enhanced CT of the abdomen and pelvis 2022.    CONTRAST/COMPLICATIONS:  IV Contrast: NONE  Oral Contrast: NONE  Complications: None reported at time of study completion    PROCEDURE:  CT of the Abdomen and Pelvis was performed.  Sagittal and coronal reformats were performed.    FINDINGS:  LOWER CHEST: Trace bilateral layering pleural fluid.    LIVER: Within normal limits.  BILE DUCTS: Normal caliber.  GALLBLADDER: There is new subtle pericholecystic stranding with trace   fluid tracking along the right paracolic gutter and into Morison's pouch..  SPLEEN: Within normal limits.  PANCREAS: Within normal limits.  ADRENALS: Within normal limits.  KIDNEYS/URETERS: Within normal limits.    BLADDER: Within normal limits.  REPRODUCTIVE ORGANS: Uterus and adnexa within normal limits.    BOWEL: No bowelobstruction. Appendix is normal. There is no mesenteric   adenopathy. There is no extraluminal gas or drainable fluid collection.  PERITONEUM: Trace free fluid in the pelvis.  VESSELS: Within normal limits.  RETROPERITONEUM/LYMPH NODES: No lymphadenopathy.  ABDOMINAL WALL: Within normal limits.  BONES: Within normal limits.      IMPRESSION: New subtle pericholecystic stranding and fluid tracking in   the right upper quadrant, suspicious for acute cholecystitis. Consider   right upper quadrant ultrasound for further initial evaluation. Results   discussed with Dr. Monique at time of interpretation.    --- End of Report ---            ZULEIMA MARTINS MD; Attending Radiologist  This document has been electronically signed. 2022  6:09PM    < end of copied text >    < from: US Abdomen Upper Quadrant Right (22 @ 19:36) >  ACC: [id number] EXAM:  US ABDOMEN RT UPR QUADRANT                          PROCEDURE DATE:  2022          INTERPRETATION:  CLINICAL INFORMATION: Pericholecystic stranding reported   on same day abdominal CT.    COMPARISON: CT abdomen pelvis 2022    TECHNIQUE: Sonography of the right upper quadrant.    FINDINGS: Technically limited exam due to patient's inability to   breath-hold.  Liver: Within normal limits.  Bile ducts: Normal caliber. Common bile duct measures .  Gallbladder: Tiny echogenic focus in the region of the gallbladder neck   without clear shadowing, possibly artifact. Normal gallbladder thickness   of 2 mm.. Sonographic Field's sign is negative. No pericholecystic fluid.  Pancreas: Visualized portions are within normal limits.  Right kidney: 9.9 cm. No hydronephrosis.  Ascites: None.  IVC: Visualized portions are within normal limits.    IMPRESSION:  Artifact versus tiny gallstone. No secondary signs of acute   cholecystitis. No pericholecystic fluid.        --- End of Report ---            KORI AGUILAR MD; Attending Radiologist  This document has been electronically signed. 2022  7:49PM    < end of copied text >

## 2022-11-03 NOTE — CONSULT NOTE ADULT - SUBJECTIVE AND OBJECTIVE BOX
Patient is a 67y old  Female who presents with a chief complaint of Starvation, initial encounter     (2022 10:11)      HPI: 66 y/o F with PMHx DM (on Jardiance) presents with weakness and decreased PO intake, found to have DKA, admitted to ICU on insulin ggt, improved and downgraded to medical service, currently with abdominal discomfort. Incidentally COVID positive.     Patient currently complaining of right upper abdominal discomfort and feeling naustead.   CT 22 new subtle pericholecystic stranding and fluid tracking in RUQ suspicious for acute cholecystitis US abdomen 22 shows artifact vs tiny gallstone, no secondary signs of acute cholecystitis, no pericholecystic fluid.     Labs today no leukocytosis, Hgb 12.4, LFTs yesterday were normal, patient is afebrile.         PAST MEDICAL & SURGICAL HISTORY:  Diabetes    Status post left breast lumpectomy          Allergies    Jardiance (Unknown)    Intolerances        MEDICATIONS  (STANDING):  atorvastatin 20 milliGRAM(s) Oral at bedtime  cefoTEtan  IVPB 2 Gram(s) IV Intermittent once  cefoTEtan  IVPB      chlorhexidine 4% Liquid 1 Application(s) Topical <User Schedule>  dextrose 5% + lactated ringers. 1000 milliLiter(s) (75 mL/Hr) IV Continuous <Continuous>  dextrose 50% Injectable 50 milliLiter(s) IV Push every 15 minutes  influenza  Vaccine (HIGH DOSE) 0.7 milliLiter(s) IntraMuscular once  insulin glargine Injectable (LANTUS) 8 Unit(s) SubCutaneous every morning  insulin lispro (ADMELOG) corrective regimen sliding scale   SubCutaneous three times a day before meals  sodium chloride 0.9%. 1000 milliLiter(s) (100 mL/Hr) IV Continuous <Continuous>    MEDICATIONS  (PRN):  acetaminophen     Tablet .. 650 milliGRAM(s) Oral every 6 hours PRN Mild Pain (1 - 3)  ondansetron Injectable 4 milliGRAM(s) IV Push once PRN Nausea      Social History:    Marital Status:  (   )    (   ) Single    (   )    (  )   Occupation:   Lives with: (  ) alone  (  ) children   (  ) spouse   (  ) parents  (  ) other    Substance Use (street drugs): (  ) never used  (  ) other:  Tobacco Usage:  (   ) never smoked   (   ) former smoker   (   ) current smoker  (     ) pack year  (        ) last cigarette date  Alcohol Usage:  Sexual History:     Family History   IBD (  ) Yes   (  ) No  GI Malignancy (  )  Yes    (  ) No    Health Management     Last Colonoscopy - pt is unsure       (     ) Advanced Directives: (     ) None    (      ) DNR    (     ) DNI    (     ) Health Care Proxy:       REVIEW OF SYSTEMS:   General: Negative  HEENT: Negative  CV: Negative  Respiratory: Negative  GI: See HPI  : Negative  MSK: Negative  Hematologic: Negative  Skin: Negative      Vital Signs Last 24 Hrs  T(C): 36.9 (2022 10:53), Max: 37.1 (2022 21:39)  T(F): 98.4 (2022 10:53), Max: 98.7 (2022 21:39)  HR: 86 (2022 10:53) (86 - 91)  BP: 109/74 (2022 10:53) (109/74 - 122/85)  BP(mean): --  RR: 18 (2022 10:53) (18 - 18)  SpO2: 99% (2022 10:53) (95% - 99%)    Parameters below as of 2022 10:53  Patient On (Oxygen Delivery Method): room air        PHYSICAL EXAM:   GENERAL:  No acute distress  HEENT:  NC/AT, conjunctiva clear, sclera anicteric  CHEST:  No increased effort, breath sounds clear  HEART:  Regular rhythm, S1, S2,   ABDOMEN:  Soft, + Field, + RUQ ttp, non-distended, normoactive bowel sounds, no guarding  EXTREMITIES: No edema  SKIN:  Warm, dry  NEURO:  Calm, cooperative        LABS:                        12.4   4.86  )-----------( 75       ( 2022 06:00 )             35.5     11-03    139  |  102  |  <3.0<L>  ----------------------------<  179<H>  4.2   |  27.0  |  0.33<L>    Ca    7.5<L>      2022 06:00  Phos  4.3     11-03  Mg     1.7     11-02    TPro  5.9<L>  /  Alb  3.1<L>  /  TBili  0.6  /  DBili  x   /  AST  34<H>  /  ALT  19  /  AlkPhos  51        Urinalysis Basic - ( 2022 09:36 )    Color: Yellow / Appearance: Clear / S.010 / pH: x  Gluc: x / Ketone: Small  / Bili: Negative / Urobili: Negative mg/dL   Blood: x / Protein: Negative / Nitrite: Negative   Leuk Esterase: Negative / RBC: 0-2 /HPF / WBC 0-2 /HPF   Sq Epi: x / Non Sq Epi: Occasional / Bacteria: Occasional      LIVER FUNCTIONS - ( 2022 22:50 )  Alb: 3.1 g/dL / Pro: 5.9 g/dL / ALK PHOS: 51 U/L / ALT: 19 U/L / AST: 34 U/L / GGT: x             RADIOLOGY & ADDITIONAL TESTS:    < from: US Abdomen Upper Quadrant Right (22 @ 19:36) >    ACC: [id number] EXAM:  US ABDOMEN RT UPR QUADRANT                          PROCEDURE DATE:  2022          INTERPRETATION:  CLINICAL INFORMATION: Pericholecystic stranding reported   on same day abdominal CT.    COMPARISON: CT abdomen pelvis 2022    TECHNIQUE: Sonography of the right upper quadrant.    FINDINGS: Technically limited exam due to patient's inability to   breath-hold.  Liver: Within normal limits.  Bile ducts: Normal caliber. Common bile duct measures .  Gallbladder: Tiny echogenic focus in the region of the gallbladder neck   without clear shadowing, possibly artifact. Normal gallbladder thickness   of 2 mm.. Sonographic Field's sign is negative. No pericholecystic fluid.  Pancreas: Visualized portions are within normal limits.  Right kidney: 9.9 cm. No hydronephrosis.  Ascites: None.  IVC: Visualized portions are within normal limits.    IMPRESSION:  Artifact versus tiny gallstone. No secondary signs of acute   cholecystitis. No pericholecystic fluid.        --- End of Report ---            KORI AGUILAR MD; Attending Radiologist  This document has been electronically signed. 2022  7:49PM    < end of copied text >        < from: CT Abdomen and Pelvis No Cont (22 @ 17:54) >    ACC: [id number] EXAM:  CT ABDOMEN AND PELVIS                          PROCEDURE DATE:  2022          INTERPRETATION:  CLINICAL INFORMATION: Abdominal pain.    COMPARISON: Contrast-enhanced CT of the abdomen and pelvis 2022.    CONTRAST/COMPLICATIONS:  IV Contrast: NONE  Oral Contrast: NONE  Complications: None reported at time of study completion    PROCEDURE:  CT of the Abdomen and Pelvis was performed.  Sagittal and coronal reformats were performed.    FINDINGS:  LOWER CHEST: Trace bilateral layering pleural fluid.    LIVER: Within normal limits.  BILE DUCTS: Normal caliber.  GALLBLADDER: There is new subtle pericholecystic stranding with trace   fluid tracking along the right paracolic gutter and into Morison's pouch..  SPLEEN: Within normal limits.  PANCREAS: Within normal limits.  ADRENALS: Within normal limits.  KIDNEYS/URETERS: Within normal limits.    BLADDER: Within normal limits.  REPRODUCTIVE ORGANS: Uterus and adnexa within normal limits.    BOWEL: No bowelobstruction. Appendix is normal. There is no mesenteric   adenopathy. There is no extraluminal gas or drainable fluid collection.  PERITONEUM: Trace free fluid in the pelvis.  VESSELS: Within normal limits.  RETROPERITONEUM/LYMPH NODES: No lymphadenopathy.  ABDOMINAL WALL: Within normal limits.  BONES: Within normal limits.      IMPRESSION: New subtle pericholecystic stranding and fluid tracking in   the right upper quadrant, suspicious for acute cholecystitis. Consider   right upper quadrant ultrasound for further initial evaluation. Results   discussed with Dr. Monique at time of interpretation.    --- End of Report ---            ZULEIMA MARTINS MD; Attending Radiologist  This document has been electronically signed. 2022  6:09PM    < end of copied text >  
66 y/o female pmhx DM presented to ER w/ weakness and decreased PO intake. Patient was seen in ER on 10/26 for similar symptoms w/ associated abdominal pain. CT abd/plevis done was negative for acute pathology. She admits to nausea w/ 1 episode of vomiting  Denies any fevers/chills, CP, SOB, diarrhea. In ER labs significant for bicarb 9, AG 27, small acetone. Patient takes SGLT2 inhibitor, concern for euglycemic DKA. Admitted to MICU for insulin gtt. Now downgraded to medicine and endocrine is consulted for diabetes management, s/p euglycemic DKA.     DM hx: Pt is a poor historian, unable to verify if she has T1DM or T2DM. She initially got diagnosed on routine lab work with her PCP "many years ago." She denies family hx of DM. She cannot recall the names of medication she takes for her diabetes. She checks her FS at home once daily in the morning but does not remember what number she usually gets. She does not see an endocrinologist. She denies hx of polyuria, polydipsia, or blurry vision. Endorses neuropathy in bilateral feet.     PAST MEDICAL & SURGICAL HISTORY:  Diabetes  Status post left breast lumpectomy - 2004    FAMILY HISTORY:  Pt denies family hx of DM    SOCIAL HISTORY: Denies smoking, ETOH    REVIEW OF SYSTEMS:  Constitutional: No fever, no chills, no change in weight.  Eyes: No eye swelling, no blurry vision, no redness, no loss of vision.  Neck: No neck pain, no change in voice.  Lungs: No shortness of breath, no wheezing, no cough  CV: No chest pain, no palpitations, no pain with walking.  GI: No nausea, no vomiting, no constipation, no diarrhea, no abdominal pain  : No urinary frequency, no blood in urine, no urinary burning, no difficulty voiding.  Musculoskeletal: No muscle pain, no joint pain, no swelling.  Skin: No rash, no infections.  Neurologic: No headaches, no weakness, no burning or pain in feet, no tremor.  Endocrine: No heat intolerance, no cold intolerance.  Psych: No depression, no anxiety.    MEDICATIONS  (STANDING):  atorvastatin 20 milliGRAM(s) Oral at bedtime  chlorhexidine 4% Liquid 1 Application(s) Topical <User Schedule>  dextrose 5% + lactated ringers. 1000 milliLiter(s) (150 mL/Hr) IV Continuous <Continuous>  dextrose 50% Injectable 50 milliLiter(s) IV Push every 15 minutes  enoxaparin Injectable 40 milliGRAM(s) SubCutaneous every 24 hours  influenza  Vaccine (HIGH DOSE) 0.7 milliLiter(s) IntraMuscular once  insulin glargine Injectable (LANTUS) 8 Unit(s) SubCutaneous every morning  insulin lispro Injectable (ADMELOG) 3 Unit(s) SubCutaneous before breakfast  insulin lispro Injectable (ADMELOG) 3 Unit(s) SubCutaneous before lunch  insulin lispro Injectable (ADMELOG) 3 Unit(s) SubCutaneous before dinner    Allergies  No Known Allergies    PHYSICAL EXAM:  General: No apparent distress  Neck: Supple, trachea midline, no thyromegaly  Respiratory: Lungs clear bilaterally, normal rate, effort  Cardiac: +S1, S2, no m/r/g  GI: +BS, soft, non tender, non distended  Extremities: No peripheral edema, no pedal lesions  Neuro: Alert, poor historian, cannot recall much of her history  Pysch: Flat affect  Skin: No acanthosis     Vital Signs Last 24 Hrs  T(C): 36.8 (01 Nov 2022 08:00), Max: 37 (01 Nov 2022 00:03)  T(F): 98.2 (01 Nov 2022 08:00), Max: 98.6 (01 Nov 2022 00:03)  HR: 95 (01 Nov 2022 14:00) (89 - 107)  BP: 121/59 (01 Nov 2022 12:00) (105/79 - 134/77)  BP(mean): 88 (01 Nov 2022 08:00) (71 - 88)  RR: 14 (01 Nov 2022 14:00) (12 - 22)  SpO2: 98% (01 Nov 2022 14:00) (97% - 99%)    Parameters below as of 01 Nov 2022 08:00  Patient On (Oxygen Delivery Method): room air      LABS:                        13.4   7.18  )-----------( 111      ( 01 Nov 2022 06:17 )             37.9     11-01    131<L>  |  97  |  17.0  ----------------------------<  95  4.0   |  19.0<L>  |  0.65    Ca    8.6      01 Nov 2022 06:17  Phos  1.0     11-01  Mg     1.9     11-01    TPro  6.6  /  Alb  3.4  /  TBili  0.4  /  DBili  x   /  AST  36<H>  /  ALT  23  /  AlkPhos  58  11-01      LIVER FUNCTIONS - ( 01 Nov 2022 06:17 )  Alb: 3.4 g/dL / Pro: 6.6 g/dL / ALK PHOS: 58 U/L / ALT: 23 U/L / AST: 36 U/L / GGT: x           POCT Blood Glucose.: 195 mg/dL (11-01-22 @ 11:03)  POCT Blood Glucose.: 140 mg/dL (11-01-22 @ 09:31)  POCT Blood Glucose.: 76 mg/dL (11-01-22 @ 08:00)  POCT Blood Glucose.: 87 mg/dL (11-01-22 @ 06:55)  POCT Blood Glucose.: 145 mg/dL (11-01-22 @ 04:55)  
ACUTE CARE SURGERY CONSULT     HPI: 67y Female present to ED with normoglycemic DKA 4 days ago, also abdominal pain, denies currently nausea or vomiting, complaining of generalized abdominal pain. denies urinary symptoms, unclear if relation with meals, denies fevers.CT scan obtained yesterday showed stranding near gallbladder, US no signs of cholecystitis    ROS: 10-system review is otherwise negative except HPI above.      PAST MEDICAL & SURGICAL HISTORY:  Diabetes      Status post left breast lumpectomy  2004        FAMILY HISTORY:    Family history not pertinent as reviewed with the patient.    SOCIAL HISTORY:  Denies any toxic habits    ALLERGIES: NKA Jardiance (Unknown)      HOME MEDICATIONS: ***  Home Medications:  empagliflozin 10 mg oral tablet: 1 tab(s) orally once a day (in the morning) (01 Nov 2022 09:59)  Lipitor 20 mg oral tablet: 1 tab(s) orally once a day (01 Nov 2022 09:59)  lisinopril 2.5 mg oral tablet: 1 tab(s) orally once a day (01 Nov 2022 09:59)  SITagliptin 100 mg oral tablet: 1 tab(s) orally once a day (01 Nov 2022 09:59)      --------------------------------------------------------------------------------------------    PHYSICAL EXAM:   General: NAD, Lying in bed comfortably  Neuro: A+Ox3  HEENT: EOMI, PERRLA, MMM  Cardio: RRR  Resp: Non labored breathing on RA  GI/Abd: Soft, diffusely tender, however not reproducible with distraction/ND, no rebound/guarding, no masses palpated  Vascular: All 4 extremities warm and well perfused.   Pelvis: stable  Musculoskeletal: All 4 extremities moving spontaneously, no limitations, no spinal tenderness.  --------------------------------------------------------------------------------------------    LABS                 12.4   4.86   )----------(  75        ( 03 Nov 2022 06:00 )               35.5      139    |  102    |  <3.0   ----------------------------<  179        ( 03 Nov 2022 06:00 )  4.2     |  27.0   |  0.33     Ca    7.5        ( 03 Nov 2022 06:00 )  Phos  4.3       ( 03 Nov 2022 06:00 )  Mg     1.7       ( 02 Nov 2022 22:50 )    TPro  5.9    /  Alb  3.1    /  TBili  0.6    /  DBili  x      /  AST  34     /  ALT  19     /  AlkPhos  51     ( 02 Nov 2022 22:50 )    LIVER FUNCTIONS - ( 02 Nov 2022 22:50 )  Alb: 3.1 g/dL / Pro: 5.9 g/dL / ALK PHOS: 51 U/L / ALT: 19 U/L / AST: 34 U/L / GGT: x               CAPILLARY BLOOD GLUCOSE            22:50 - VBG - pH:       | pCO2:       | pO2:       | Lactate: 1.40     --------------------------------------------------------------------------------------------  IMAGING  reviewed    ASSESSMENT: Patient is a 67y old female with COVID, normoglycemic DKA, with abdominal pain, concern for cholecystitis not found on US, if concern persists recommend HIDA, location history not typical for cholecystitis. If HIDA positive will likely recommend Cholecystostomy tube    PLAN:    - IVF  - f/u HIDA  - trend WBC/LFTs  - will continue to follow  - Discussed with Dr. Olguin

## 2022-11-03 NOTE — DIETITIAN NUTRITION RISK NOTIFICATION - ADDITIONAL COMMENTS/DIETITIAN RECOMMENDATIONS
Rec advance to clears, then consistent CHO  Rec Glucerna shake BID to optimize po intake and provide an additional 220 kcal, 10g protein per serving

## 2022-11-03 NOTE — PROGRESS NOTE ADULT - ASSESSMENT
68 y/o female pmhx DM presented to ER with weakness and decreased PO intake. Patient was seen in ER on 10/26 for similar symptoms w/ associated abdominal pain. In ER labs significant for bicarb 9, AG 27, small acetone. Patient takes SGLT2 inhibitor which caused euglycemic DKA.    DM hx: Spoke with pts brother. Pt has mild MR so does not know much about her medications/history. She lives with her brother who takes care of all her medications. She was previously taking Januvia 100mg and Jardiance 10mg daily.     1. Controlled T2DM, a1c 6.8%  - Continue lantus 8 units qhs  - Can discharge on Januvia 100mg  - Do not resume Jardiance as it caused euglycemic DKA (spoke with brother who handles her medications)  - Cpeptide 1.3/KAMRYN pending    2. COVID - Continue supportive measures/isolation precautions.    3. Abdominal pain - Persistent abd pain, RUQ sono negative for evidence of acute choley. LFTS within normal limits, GI consult.

## 2022-11-03 NOTE — DIETITIAN INITIAL EVALUATION ADULT - PERTINENT MEDS FT
MEDICATIONS  (STANDING):  atorvastatin 20 milliGRAM(s) Oral at bedtime  chlorhexidine 4% Liquid 1 Application(s) Topical <User Schedule>  dextrose 5% + lactated ringers. 1000 milliLiter(s) (75 mL/Hr) IV Continuous <Continuous>  dextrose 50% Injectable 50 milliLiter(s) IV Push every 15 minutes  influenza  Vaccine (HIGH DOSE) 0.7 milliLiter(s) IntraMuscular once  insulin glargine Injectable (LANTUS) 8 Unit(s) SubCutaneous every morning  insulin lispro (ADMELOG) corrective regimen sliding scale   SubCutaneous three times a day before meals    MEDICATIONS  (PRN):  acetaminophen     Tablet .. 650 milliGRAM(s) Oral every 6 hours PRN Mild Pain (1 - 3)  ondansetron Injectable 4 milliGRAM(s) IV Push once PRN Nausea

## 2022-11-03 NOTE — DIETITIAN NUTRITION RISK NOTIFICATION - TREATMENT: THE FOLLOWING DIET HAS BEEN RECOMMENDED
Diet, Regular (11-03-22 @ 09:10) [Available for Activation]  Diet, Clear Liquid (11-03-22 @ 09:10) [Available for Activation]

## 2022-11-03 NOTE — PROGRESS NOTE ADULT - SUBJECTIVE AND OBJECTIVE BOX
INTERVAL EVENTS:  Follow up diabetes management  Brother at bedside    ROS: Patient denies chest pain, SOB, n/v. Endorses abdominal pain.     MEDICATIONS  (STANDING):  atorvastatin 20 milliGRAM(s) Oral at bedtime  chlorhexidine 4% Liquid 1 Application(s) Topical <User Schedule>  dextrose 5% + lactated ringers. 1000 milliLiter(s) (75 mL/Hr) IV Continuous <Continuous>  dextrose 50% Injectable 50 milliLiter(s) IV Push every 15 minutes  influenza  Vaccine (HIGH DOSE) 0.7 milliLiter(s) IntraMuscular once  insulin glargine Injectable (LANTUS) 8 Unit(s) SubCutaneous every morning  insulin lispro (ADMELOG) corrective regimen sliding scale   SubCutaneous three times a day before meals    MEDICATIONS  (PRN):  acetaminophen     Tablet .. 650 milliGRAM(s) Oral every 6 hours PRN Mild Pain (1 - 3)  ondansetron Injectable 4 milliGRAM(s) IV Push once PRN Nausea    Allergies  Jardiance (Unknown)    Vital Signs Last 24 Hrs  T(C): 36.9 (2022 10:53), Max: 37.1 (2022 21:39)  T(F): 98.4 (2022 10:53), Max: 98.7 (2022 21:39)  HR: 86 (2022 10:53) (86 - 91)  BP: 109/74 (2022 10:53) (109/74 - 122/85)  BP(mean): --  RR: 18 (2022 10:53) (18 - 18)  SpO2: 99% (2022 10:53) (95% - 99%)    Parameters below as of 2022 10:53  Patient On (Oxygen Delivery Method): room air      PHYSICAL EXAM:  General: No apparent distress  Neck: Supple, trachea midline, no thyromegaly  Respiratory: Lungs clear bilaterally, normal rate, effort  Cardiac: +S1, S2, no m/r/g  GI: +BS, soft, non tender, non distended  Extremities: No peripheral edema, no pedal lesions  Neuro: Alert, disoriented at times (has hx mild MR)    LABS:                        12.4   4.86  )-----------( 75       ( 2022 06:00 )             35.5     11-03    139  |  102  |  <3.0<L>  ----------------------------<  179<H>  4.2   |  27.0  |  0.33<L>    Ca    7.5<L>      2022 06:00  Phos  4.3       Mg     1.7         TPro  5.9<L>  /  Alb  3.1<L>  /  TBili  0.6  /  DBili  x   /  AST  34<H>  /  ALT  19  /  AlkPhos  51      Urinalysis Basic - ( 2022 09:36 )    Color: Yellow / Appearance: Clear / S.010 / pH: x  Gluc: x / Ketone: Small  / Bili: Negative / Urobili: Negative mg/dL   Blood: x / Protein: Negative / Nitrite: Negative   Leuk Esterase: Negative / RBC: 0-2 /HPF / WBC 0-2 /HPF   Sq Epi: x / Non Sq Epi: Occasional / Bacteria: Occasional    POCT Blood Glucose.: 137 mg/dL (22 @ 11:28)  POCT Blood Glucose.: 156 mg/dL (22 @ 08:32)  POCT Blood Glucose.: 138 mg/dL (22 @ 21:46)  POCT Blood Glucose.: 90 mg/dL (22 @ 17:36)    Thyroid Stimulating Hormone, Serum: 1.12 uIU/mL (22 @ 06:00)

## 2022-11-03 NOTE — PROGRESS NOTE ADULT - ASSESSMENT
Patient is a 67 year old female with DM (on Jardiance) admitted to ICU for DKA. Started on insulin ggt with improvement in AG. Now transferred to Medicine for further management. Continues to complain of abdominal pain today. LFTs WNL. CT abdomen with concerns for acute cholecytitis, but US was negative.    Euglycemic DKA   sugars labile  continue lantus and hold premeal ademalog  continue ISS  diabetic teaching  f/u Cpeptide/KAMRYN   avoid jardiance    endo recs appreciated, recommend Januvia on dc     Abdominal Pain; Concerns for Acute cholecystitis  -CT abdomen with concerns for acute choley  -RUQ sono negative for evidence of acute choley  -LFTS WNL  -lipase 35  -serial abdominal exam  -GI and Surgery consulted  -HIDA ordered  -NPO, IVF.    Covid 19 infection  Asymptomatic; saturating well on room air  Continue isolation precautions    HLD  continue atorvastatin      Hypokalemia and Hypophosphatemia due to poor intake  -Improved  -monitor labs    DVT ppx : Lovenox SC resumed    Dispo - Remains acute given concern for acute cholecystitis, HIDA scan pending, Surgery consulted.  Patient is a 67 year old female with DM (on Jardiance) admitted to ICU for DKA. Started on insulin ggt with improvement in AG. Now transferred to Medicine for further management. Continues to complain of abdominal pain today. LFTs WNL. CT abdomen with concerns for acute cholecytitis, but US was negative.    Euglycemic DKA   sugars labile  sugars low/normal; hold lantus tonight given NPO status  continue ISS  diabetic teaching  f/u Cpeptide/KAMRYN   avoid Jardiance    endo recs appreciated, recommend Januvia on dc     Abdominal Pain; Concerns for Acute cholecystitis  -CT abdomen with concerns for acute choley  -RUQ sono negative for evidence of acute choley  -LFTS WNL  -lipase 35  -serial abdominal exam  -GI and Surgery consulted  -HIDA ordered  -NPO, IVF  - Add morphine as needed    Covid 19 infection  Asymptomatic; saturating well on room air  Continue isolation precautions    HLD  continue atorvastatin      Hypokalemia and Hypophosphatemia due to poor intake  -Improved  -monitor labs    DVT ppx : Lovenox SC resumed    Dispo - Remains acute given concern for acute cholecystitis, HIDA scan pending, Surgery consulted.

## 2022-11-03 NOTE — CONSULT NOTE ADULT - ASSESSMENT
67 year old female with DM (on Jardiance) admitted to ICU for DKA. Started on insulin gtt with improvement in AG. Now transferred to medicine service for further management.     1. DM, a1c pending  - Continue lantus 8 units qhs  - Continue admelog 3 units TID with meals  - Will continue to monitor and make insulin adjustments  - C-peptide/KAMRYN/A1C pending  - Will check TFTs  - Will not resume Jardiance upon discharge due to euglycemic DKA    2. HLD - Continue atorvastatin.
68 y/o F with PMHx DM (on Jardiance) presents with weakness and decreased PO intake, found to have DKA, admitted to ICU on insulin ggt, improved and downgraded to medical service, currently with abdominal discomfort. Incidentally COVID positive.

## 2022-11-03 NOTE — PROGRESS NOTE ADULT - NS PANP COMMENT GEN_ALL_CORE FT
I have seen and examined the patient and agree with the above assessment and plan. No overnight events. Patient complaining of generalized abdominal pain with diarrhea today. No vomiting but nauseous. CT abdomen yesterday with concerns for possible choley but US negative. Tolerated clear liquid diet today. However, pain persisted therefore GI consulted who recommended HIDA and general surgery evaluation. Will make patient NPO, add IV abx, IVF. If HIDA positive, will need cholecystostomy tube. Rest of the management as above.

## 2022-11-03 NOTE — DIETITIAN INITIAL EVALUATION ADULT - PERTINENT LABORATORY DATA
11-03    139  |  102  |  <3.0<L>  ----------------------------<  179<H>  4.2   |  27.0  |  0.33<L>    Ca    7.5<L>      03 Nov 2022 06:00  Phos  4.3     11-03  Mg     1.7     11-02    TPro  5.9<L>  /  Alb  3.1<L>  /  TBili  0.6  /  DBili  x   /  AST  34<H>  /  ALT  19  /  AlkPhos  51  11-02  POCT Blood Glucose.: 156 mg/dL (11-03-22 @ 08:32)  A1C with Estimated Average Glucose Result: 6.8 % (11-02-22 @ 06:00)

## 2022-11-03 NOTE — CONSULT NOTE ADULT - ATTENDING COMMENTS
67 year old woman being evaluated for cholecystitis.   Patient admitted to MICU 11/1 for DKA. Also COVID+, asymptomatic.   Complains of vague diffuse abdominal pain for the past 2 weeks.   Denies nausea or vomiting.   Vitals unremarkable.   Abdomin non distended, mild tenderness to palpation over bilateral lower quadrants.   Labs grossly unremarkable.   CT with fat stranding around gallbladder. RUQ ultrasound with no evidence of cholecystitis.     A/P: Vague abdominal pain, unclear etiology. Possible biliary origin vs related to DKA.   -HIDA scan   -Consider percutaneous cholecystostomy tube if positive.   -Surgery to follow

## 2022-11-03 NOTE — CONSULT NOTE ADULT - PROBLEM SELECTOR RECOMMENDATION 9
CT 11/2/22 new subtle pericholecystic stranding and fluid tracking in RUQ suspicious for acute cholecystitis US abdomen 11/2/22 shows artifact vs tiny gallstone, no secondary signs of acute cholecystitis, no pericholecystic fluid. + Field, + RUQ ttp    - Recommend surgery consult for evaluation of acute cholecystitis, may obtain HIDA scan for further assessment. Plan discussed with primary hospitalist     _________________________________________________________________  Assessment and recommendations are final when note is signed by the attending physician.

## 2022-11-04 ENCOUNTER — TRANSCRIPTION ENCOUNTER (OUTPATIENT)
Age: 67
End: 2022-11-04

## 2022-11-04 VITALS
RESPIRATION RATE: 18 BRPM | TEMPERATURE: 99 F | HEART RATE: 84 BPM | DIASTOLIC BLOOD PRESSURE: 73 MMHG | OXYGEN SATURATION: 98 % | SYSTOLIC BLOOD PRESSURE: 118 MMHG

## 2022-11-04 LAB
ALBUMIN SERPL ELPH-MCNC: 2.9 G/DL — LOW (ref 3.3–5.2)
ALP SERPL-CCNC: 54 U/L — SIGNIFICANT CHANGE UP (ref 40–120)
ALT FLD-CCNC: 18 U/L — SIGNIFICANT CHANGE UP
ANION GAP SERPL CALC-SCNC: 9 MMOL/L — SIGNIFICANT CHANGE UP (ref 5–17)
AST SERPL-CCNC: 26 U/L — SIGNIFICANT CHANGE UP
BASOPHILS # BLD AUTO: 0.01 K/UL — SIGNIFICANT CHANGE UP (ref 0–0.2)
BASOPHILS NFR BLD AUTO: 0.2 % — SIGNIFICANT CHANGE UP (ref 0–2)
BILIRUB SERPL-MCNC: 0.3 MG/DL — LOW (ref 0.4–2)
BUN SERPL-MCNC: <3 MG/DL — LOW (ref 8–20)
CALCIUM SERPL-MCNC: 7.9 MG/DL — LOW (ref 8.4–10.5)
CHLORIDE SERPL-SCNC: 101 MMOL/L — SIGNIFICANT CHANGE UP (ref 96–108)
CO2 SERPL-SCNC: 29 MMOL/L — SIGNIFICANT CHANGE UP (ref 22–29)
CREAT SERPL-MCNC: 0.59 MG/DL — SIGNIFICANT CHANGE UP (ref 0.5–1.3)
EGFR: 99 ML/MIN/1.73M2 — SIGNIFICANT CHANGE UP
EOSINOPHIL # BLD AUTO: 0.26 K/UL — SIGNIFICANT CHANGE UP (ref 0–0.5)
EOSINOPHIL NFR BLD AUTO: 5.3 % — SIGNIFICANT CHANGE UP (ref 0–6)
GLUCOSE BLDC GLUCOMTR-MCNC: 124 MG/DL — HIGH (ref 70–99)
GLUCOSE BLDC GLUCOMTR-MCNC: 125 MG/DL — HIGH (ref 70–99)
GLUCOSE BLDC GLUCOMTR-MCNC: 195 MG/DL — HIGH (ref 70–99)
GLUCOSE SERPL-MCNC: 143 MG/DL — HIGH (ref 70–99)
HCT VFR BLD CALC: 36.4 % — SIGNIFICANT CHANGE UP (ref 34.5–45)
HGB BLD-MCNC: 12.6 G/DL — SIGNIFICANT CHANGE UP (ref 11.5–15.5)
IMM GRANULOCYTES NFR BLD AUTO: 0.4 % — SIGNIFICANT CHANGE UP (ref 0–0.9)
LYMPHOCYTES # BLD AUTO: 1.25 K/UL — SIGNIFICANT CHANGE UP (ref 1–3.3)
LYMPHOCYTES # BLD AUTO: 25.4 % — SIGNIFICANT CHANGE UP (ref 13–44)
MAGNESIUM SERPL-MCNC: 1.6 MG/DL — LOW (ref 1.8–2.6)
MCHC RBC-ENTMCNC: 30.7 PG — SIGNIFICANT CHANGE UP (ref 27–34)
MCHC RBC-ENTMCNC: 34.6 GM/DL — SIGNIFICANT CHANGE UP (ref 32–36)
MCV RBC AUTO: 88.8 FL — SIGNIFICANT CHANGE UP (ref 80–100)
MONOCYTES # BLD AUTO: 0.4 K/UL — SIGNIFICANT CHANGE UP (ref 0–0.9)
MONOCYTES NFR BLD AUTO: 8.1 % — SIGNIFICANT CHANGE UP (ref 2–14)
NEUTROPHILS # BLD AUTO: 2.99 K/UL — SIGNIFICANT CHANGE UP (ref 1.8–7.4)
NEUTROPHILS NFR BLD AUTO: 60.6 % — SIGNIFICANT CHANGE UP (ref 43–77)
PHOSPHATE SERPL-MCNC: 2.4 MG/DL — SIGNIFICANT CHANGE UP (ref 2.4–4.7)
PLATELET # BLD AUTO: 100 K/UL — LOW (ref 150–400)
POTASSIUM SERPL-MCNC: 3.5 MMOL/L — SIGNIFICANT CHANGE UP (ref 3.5–5.3)
POTASSIUM SERPL-SCNC: 3.5 MMOL/L — SIGNIFICANT CHANGE UP (ref 3.5–5.3)
PROT SERPL-MCNC: 5.6 G/DL — LOW (ref 6.6–8.7)
RBC # BLD: 4.1 M/UL — SIGNIFICANT CHANGE UP (ref 3.8–5.2)
RBC # FLD: 13.2 % — SIGNIFICANT CHANGE UP (ref 10.3–14.5)
SODIUM SERPL-SCNC: 139 MMOL/L — SIGNIFICANT CHANGE UP (ref 135–145)
WBC # BLD: 4.93 K/UL — SIGNIFICANT CHANGE UP (ref 3.8–10.5)
WBC # FLD AUTO: 4.93 K/UL — SIGNIFICANT CHANGE UP (ref 3.8–10.5)

## 2022-11-04 PROCEDURE — 99232 SBSQ HOSP IP/OBS MODERATE 35: CPT | Mod: FS

## 2022-11-04 PROCEDURE — 99232 SBSQ HOSP IP/OBS MODERATE 35: CPT

## 2022-11-04 PROCEDURE — 85025 COMPLETE CBC W/AUTO DIFF WBC: CPT

## 2022-11-04 PROCEDURE — 80048 BASIC METABOLIC PNL TOTAL CA: CPT

## 2022-11-04 PROCEDURE — 87637 SARSCOV2&INF A&B&RSV AMP PRB: CPT

## 2022-11-04 PROCEDURE — A9537: CPT

## 2022-11-04 PROCEDURE — 78226 HEPATOBILIARY SYSTEM IMAGING: CPT

## 2022-11-04 PROCEDURE — 86341 ISLET CELL ANTIBODY: CPT

## 2022-11-04 PROCEDURE — 83690 ASSAY OF LIPASE: CPT

## 2022-11-04 PROCEDURE — 87040 BLOOD CULTURE FOR BACTERIA: CPT

## 2022-11-04 PROCEDURE — 74176 CT ABD & PELVIS W/O CONTRAST: CPT

## 2022-11-04 PROCEDURE — 76705 ECHO EXAM OF ABDOMEN: CPT

## 2022-11-04 PROCEDURE — 84100 ASSAY OF PHOSPHORUS: CPT

## 2022-11-04 PROCEDURE — 84681 ASSAY OF C-PEPTIDE: CPT

## 2022-11-04 PROCEDURE — 83735 ASSAY OF MAGNESIUM: CPT

## 2022-11-04 PROCEDURE — 82803 BLOOD GASES ANY COMBINATION: CPT

## 2022-11-04 PROCEDURE — 83605 ASSAY OF LACTIC ACID: CPT

## 2022-11-04 PROCEDURE — 82009 KETONE BODYS QUAL: CPT

## 2022-11-04 PROCEDURE — 87086 URINE CULTURE/COLONY COUNT: CPT

## 2022-11-04 PROCEDURE — 86803 HEPATITIS C AB TEST: CPT

## 2022-11-04 PROCEDURE — 82150 ASSAY OF AMYLASE: CPT

## 2022-11-04 PROCEDURE — 99239 HOSP IP/OBS DSCHRG MGMT >30: CPT

## 2022-11-04 PROCEDURE — 84443 ASSAY THYROID STIM HORMONE: CPT

## 2022-11-04 PROCEDURE — 81001 URINALYSIS AUTO W/SCOPE: CPT

## 2022-11-04 PROCEDURE — 93005 ELECTROCARDIOGRAM TRACING: CPT

## 2022-11-04 PROCEDURE — 99285 EMERGENCY DEPT VISIT HI MDM: CPT | Mod: 25

## 2022-11-04 PROCEDURE — 80053 COMPREHEN METABOLIC PANEL: CPT

## 2022-11-04 PROCEDURE — 78226 HEPATOBILIARY SYSTEM IMAGING: CPT | Mod: 26

## 2022-11-04 PROCEDURE — 83036 HEMOGLOBIN GLYCOSYLATED A1C: CPT

## 2022-11-04 PROCEDURE — 84436 ASSAY OF TOTAL THYROXINE: CPT

## 2022-11-04 PROCEDURE — 83930 ASSAY OF BLOOD OSMOLALITY: CPT

## 2022-11-04 PROCEDURE — 82962 GLUCOSE BLOOD TEST: CPT

## 2022-11-04 PROCEDURE — 85027 COMPLETE CBC AUTOMATED: CPT

## 2022-11-04 PROCEDURE — 36415 COLL VENOUS BLD VENIPUNCTURE: CPT

## 2022-11-04 RX ORDER — PANTOPRAZOLE SODIUM 20 MG/1
1 TABLET, DELAYED RELEASE ORAL
Qty: 30 | Refills: 0
Start: 2022-11-04 | End: 2022-12-03

## 2022-11-04 RX ORDER — EMPAGLIFLOZIN 10 MG/1
1 TABLET, FILM COATED ORAL
Qty: 0 | Refills: 0 | DISCHARGE

## 2022-11-04 RX ADMIN — Medication 100 GRAM(S): at 05:13

## 2022-11-04 RX ADMIN — Medication 2: at 16:43

## 2022-11-04 RX ADMIN — SODIUM CHLORIDE 75 MILLILITER(S): 9 INJECTION, SOLUTION INTRAVENOUS at 08:00

## 2022-11-04 RX ADMIN — CHLORHEXIDINE GLUCONATE 1 APPLICATION(S): 213 SOLUTION TOPICAL at 07:59

## 2022-11-04 NOTE — PROGRESS NOTE ADULT - SUBJECTIVE AND OBJECTIVE BOX
INTERVAL HPI/OVERNIGHT EVENTS/SUBJECTIVE:  Pt seen , with generalized abdominal pain. pending hida     ICU Vital Signs Last 24 Hrs  T(C): 36.7 (2022 20:32), Max: 36.9 (2022 10:53)  T(F): 98.1 (2022 20:32), Max: 98.4 (2022 10:53)  HR: 84 (2022 20:32) (84 - 91)  BP: 125/76 (2022 20:32) (108/72 - 125/76)  BP(mean): --  ABP: --  ABP(mean): --  RR: 18 (2022 20:32) (18 - 18)  SpO2: 98% (:32) (97% - 99%)    O2 Parameters below as of 2022 20:32  Patient On (Oxygen Delivery Method): room air      I&O's Detail    2022 07:01  -  2022 07:00  --------------------------------------------------------  IN:    dextrose 5% + lactated ringers: 1800 mL  Total IN: 1800 mL    OUT:  Total OUT: 0 mL    Total NET: 1800 mL    MEDICATIONS  (STANDING):  atorvastatin 20 milliGRAM(s) Oral at bedtime  cefoTEtan  IVPB      cefoTEtan  IVPB 2 Gram(s) IV Intermittent every 12 hours  chlorhexidine 4% Liquid 1 Application(s) Topical <User Schedule>  dextrose 5% + sodium chloride 0.45%. 1000 milliLiter(s) (75 mL/Hr) IV Continuous <Continuous>  dextrose 50% Injectable 50 milliLiter(s) IV Push every 15 minutes  influenza  Vaccine (HIGH DOSE) 0.7 milliLiter(s) IntraMuscular once  insulin lispro (ADMELOG) corrective regimen sliding scale   SubCutaneous three times a day before meals    MEDICATIONS  (PRN):  acetaminophen     Tablet .. 650 milliGRAM(s) Oral every 6 hours PRN Mild Pain (1 - 3)  morphine  - Injectable 2 milliGRAM(s) IV Push every 6 hours PRN Severe Pain (7 - 10)  morphine  - Injectable 1 milliGRAM(s) IV Push every 6 hours PRN Moderate Pain (4 - 6)  ondansetron Injectable 4 milliGRAM(s) IV Push once PRN Nausea      MISC:     PHYSICAL EXAM:  General: NAD, Lying in bed comfortably  Neuro: A+Ox3  Cardio: RRR  Resp: Non labored breathing on RA  GI/Abd: Soft, diffusely tender, non distended, no rebound/guarding, no masses palpated  Vascular: All 4 extremities warm and well perfused.       LABS:  CBC Full  -  ( 2022 06:00 )  WBC Count : 4.86 K/uL  RBC Count : 4.06 M/uL  Hemoglobin : 12.4 g/dL  Hematocrit : 35.5 %  Platelet Count - Automated : 75 K/uL  Mean Cell Volume : 87.4 fl  Mean Cell Hemoglobin : 30.5 pg  Mean Cell Hemoglobin Concentration : 34.9 gm/dL  Auto Neutrophil # : x  Auto Lymphocyte # : x  Auto Monocyte # : x  Auto Eosinophil # : x  Auto Basophil # : x  Auto Neutrophil % : x  Auto Lymphocyte % : x  Auto Monocyte % : x  Auto Eosinophil % : x  Auto Basophil % : x        139  |  102  |  <3.0<L>  ----------------------------<  179<H>  4.2   |  27.0  |  0.33<L>    Ca    7.5<L>      2022 06:00  Phos  4.3       Mg     1.7         TPro  5.9<L>  /  Alb  3.1<L>  /  TBili  0.6  /  DBili  x   /  AST  34<H>  /  ALT  19  /  AlkPhos  51        Urinalysis Basic - ( 2022 09:36 )    Color: Yellow / Appearance: Clear / S.010 / pH: x  Gluc: x / Ketone: Small  / Bili: Negative / Urobili: Negative mg/dL   Blood: x / Protein: Negative / Nitrite: Negative   Leuk Esterase: Negative / RBC: 0-2 /HPF / WBC 0-2 /HPF   Sq Epi: x / Non Sq Epi: Occasional / Bacteria: Occasional      RECENT CULTURES:   Clean Catch Clean Catch (Midstream) XXXX XXXX   <10,000 CFU/mL Normal Urogenital Anabel        LIVER FUNCTIONS - ( 2022 22:50 )  Alb: 3.1 g/dL / Pro: 5.9 g/dL / ALK PHOS: 51 U/L / ALT: 19 U/L / AST: 34 U/L / GGT: x           ASSESSMENT/PLAN:  67yFemale COVID, normoglycemic DKA, with abdominal pain, concern for cholecystitis not found on US  - IVF  - f/u HIDA  - trend WBC/LFTs  - will continue to follow

## 2022-11-04 NOTE — DISCHARGE NOTE PROVIDER - PROVIDER TOKENS
PROVIDER:[TOKEN:[[id number]]],PROVIDER:[TOKEN:[[id number]]],FREE:[LAST:[Primary doctor],PHONE:[(   )    -],FAX:[(   )    -]]

## 2022-11-04 NOTE — DISCHARGE NOTE PROVIDER - CARE PROVIDERS DIRECT ADDRESSES
[email or web address]

## 2022-11-04 NOTE — CHART NOTE - NSCHARTNOTEFT_GEN_A_CORE
HIDA results from today reviewed, no evidence of cholecystitis. Surgery to sign off.  Please reconsult as needed.

## 2022-11-04 NOTE — PROGRESS NOTE ADULT - ASSESSMENT
68 y/o F with PMHx DM (on Jardiance) presents with weakness and decreased PO intake, found to have DKA, admitted to ICU on insulin ggt, improved and downgraded to medical service, currently with abdominal discomfort. Incidentally COVID positive.

## 2022-11-04 NOTE — PROGRESS NOTE ADULT - ASSESSMENT
68 y/o female pmhx DM presented to ER with weakness and decreased PO intake. Patient was seen in ER on 10/26 for similar symptoms w/ associated abdominal pain. In ER labs significant for bicarb 9, AG 27, small acetone. Patient takes SGLT2 inhibitor which caused euglycemic DKA.    DM hx: Spoke with pts brother. Pt has mild MR so does not know much about her medications/history. She lives with her brother who takes care of all her medications. She was previously taking Januvia 100mg and Jardiance 10mg daily.     1. Controlled T2DM, a1c 6.8%  - Continue lantus 8 units qhs  - Can discharge on Januvia 100mg  - Do not resume Jardiance as it caused euglycemic DKA (spoke with brother who handles her medications)  - Cpeptide 1.3/KAMRYN pending    2. COVID - Continue supportive measures/isolation precautions.    3. Persistent abdominal pain - GI/surgery consult.  HIDA scan/consider percutaneous cholecystostomy tube if positive as per surgery. No

## 2022-11-04 NOTE — PROGRESS NOTE ADULT - SUBJECTIVE AND OBJECTIVE BOX
INTERVAL EVENTS:  Follow up diabetes management    ROS: Patient denies chest pain, SOB. Still complains of abdominal pain.    MEDICATIONS  (STANDING):  atorvastatin 20 milliGRAM(s) Oral at bedtime  chlorhexidine 4% Liquid 1 Application(s) Topical <User Schedule>  dextrose 50% Injectable 50 milliLiter(s) IV Push every 15 minutes  influenza  Vaccine (HIGH DOSE) 0.7 milliLiter(s) IntraMuscular once  insulin lispro (ADMELOG) corrective regimen sliding scale   SubCutaneous three times a day before meals    MEDICATIONS  (PRN):  acetaminophen     Tablet .. 650 milliGRAM(s) Oral every 6 hours PRN Mild Pain (1 - 3)  morphine  - Injectable 2 milliGRAM(s) IV Push every 6 hours PRN Severe Pain (7 - 10)  morphine  - Injectable 1 milliGRAM(s) IV Push every 6 hours PRN Moderate Pain (4 - 6)  ondansetron Injectable 4 milliGRAM(s) IV Push once PRN Nausea    Allergies  Jardiance (Unknown)    Vital Signs Last 24 Hrs  T(C): 36.8 (04 Nov 2022 11:57), Max: 37.1 (04 Nov 2022 08:11)  T(F): 98.3 (04 Nov 2022 11:57), Max: 98.7 (04 Nov 2022 08:11)  HR: 77 (04 Nov 2022 11:57) (77 - 87)  BP: 130/76 (04 Nov 2022 11:57) (108/72 - 130/76)  BP(mean): --  RR: 18 (04 Nov 2022 11:57) (18 - 18)  SpO2: 98% (04 Nov 2022 11:57) (93% - 99%)    Parameters below as of 04 Nov 2022 11:57  Patient On (Oxygen Delivery Method): room air      PHYSICAL EXAM:  General: No apparent distress  Neck: Supple, trachea midline, no thyromegaly  Respiratory: Lungs clear bilaterally, normal rate, effort  Cardiac: +S1, S2, no m/r/g  GI: +BS, soft, non tender, non distended  Extremities: No peripheral edema, no pedal lesions  Neuro: Alert, awake      LABS:                        12.6   4.93  )-----------( 100      ( 04 Nov 2022 06:54 )             36.4     11-04    139  |  101  |  <3.0<L>  ----------------------------<  143<H>  3.5   |  29.0  |  0.59    Ca    7.9<L>      04 Nov 2022 06:54  Phos  2.4     11-04  Mg     1.6     11-04    TPro  5.6<L>  /  Alb  2.9<L>  /  TBili  0.3<L>  /  DBili  x   /  AST  26  /  ALT  18  /  AlkPhos  54  11-04      POCT Blood Glucose.: 124 mg/dL (11-04-22 @ 12:08)  POCT Blood Glucose.: 125 mg/dL (11-04-22 @ 07:53)  POCT Blood Glucose.: 79 mg/dL (11-03-22 @ 21:22)  POCT Blood Glucose.: 104 mg/dL (11-03-22 @ 16:54)    Thyroid Stimulating Hormone, Serum: 1.12 uIU/mL (11-02-22 @ 06:00)

## 2022-11-04 NOTE — PROGRESS NOTE ADULT - ATTENDING COMMENTS
Awake alert  Bilateral BS  Hemodynamic intact  Abdomen soft, not tender, no masses R or G  Neurologic intact  Patient is pending HIDA. Patient clearly does not have acute cholecystitis and at best has biliary disfunction.

## 2022-11-04 NOTE — DISCHARGE NOTE NURSING/CASE MANAGEMENT/SOCIAL WORK - NSDCPEFALRISK_GEN_ALL_CORE
For information on Fall & Injury Prevention, visit: [email or web address] OR  [email or web address] OR  https://www.cdc.gov/steadi/patient.html

## 2022-11-04 NOTE — DISCHARGE NOTE PROVIDER - ATTENDING DISCHARGE PHYSICAL EXAMINATION:
PHYSICAL EXAM:    GENERAL: elderly female, laying in bed, NAD  HEAD:  Atraumatic, Normocephalic  EYES: EOMI, PERRLA, conjunctiva and sclera clear  ENMT: Moist mucous membranes  NECK: Supple  NERVOUS SYSTEM:  Alert & Oriented X3, no focal deficits  CHEST/LUNG: Clear to auscultation bilaterally; No rales, rhonchi, wheezing, or rubs  HEART: Regular rate and rhythm; No murmurs, rubs, or gallops  ABDOMEN: Soft, minimal tenderness, negative murphys sign, obese Bowel sounds present  EXTREMITIES:  no pedal edema

## 2022-11-04 NOTE — DISCHARGE NOTE PROVIDER - CARE PROVIDER_API CALL
Milo Concepcion)  EndocrinologyMetabDiabetes; Internal Medicine  [address] [place] [address]  Phone: [phone]  Fax: [phone]  Follow Up Time:     Elijah Kwok)  Gastroenterology; Internal Medicine  [address], [address]  [place] [address]  Phone: [phone]  Fax: [phone]  Follow Up Time:     Primary doctor,   Phone: (   )    -  Fax: (   )    -  Follow Up Time:

## 2022-11-04 NOTE — DISCHARGE NOTE PROVIDER - HOSPITAL COURSE
Patient is a 67 year old female with DM (on Jardiance) admitted to ICU for DKA. Started on insulin ggt with improvement in AG. Now transferred to Medicine for further management. Endocrinology followed, recommended Januvia on discharge.  Patient was also found to be COVID positive.  Patient asymptomatic and didn't require treatment.  Continued to complain of abdominal pain. LFTs WNL. CT abdomen with concerns for acute cholecystitis, but US was negative.  GI and surgery consulted.  HIDA scan negative for acute cholecystitis.  Diet advanced. Patient is a 67 year old female with DM (on Jardiance) admitted to ICU for DKA. Started on insulin ggt with improvement in AG. Now transferred to Medicine for further management. Endocrinology followed, recommended only Januvia on discharge.  Patient was also found to be COVID positive.  Patient asymptomatic and didn't require treatment.  Continued to complain of abdominal pain. LFTs WNL. CT abdomen with concerns for acute cholecystitis, but US was negative.  GI and surgery consulted.  HIDA scan negative for acute cholecystitis.  Diet advanced.  Patient tolerating PO diet and an outpatient EGD was recommended per GI. Patient is medically stable for discharge home at this time.

## 2022-11-04 NOTE — DISCHARGE NOTE PROVIDER - NSDCMRMEDTOKEN_GEN_ALL_CORE_FT
empagliflozin 10 mg oral tablet: 1 tab(s) orally once a day (in the morning)  Lipitor 20 mg oral tablet: 1 tab(s) orally once a day  lisinopril 2.5 mg oral tablet: 1 tab(s) orally once a day  SITagliptin 100 mg oral tablet: 1 tab(s) orally once a day   Lipitor 20 mg oral tablet: 1 tab(s) orally once a day  lisinopril 2.5 mg oral tablet: 1 tab(s) orally once a day  Protonix 40 mg oral delayed release tablet: 1 tab(s) orally once a day   SITagliptin 100 mg oral tablet: 1 tab(s) orally once a day

## 2022-11-04 NOTE — PROGRESS NOTE ADULT - SUBJECTIVE AND OBJECTIVE BOX
Chief Complaint:  Patient is a 67y old  Female who presents with a chief complaint of Starvation, initial encounter.      HPI/ 24 hr events: Patient seen and examined at bedside. Reports abdominal pain remains present. No leukocytosis, CBC stable, LFTs normal. Reports normal BM today. Denies nausea, vomiting, abdominal pain, hematemesis, hematochezia, melena.       REVIEW OF SYSTEMS:   General: Negative  HEENT: Negative  CV: Negative  Respiratory: Negative  GI: See HPI  : Negative  MSK: Negative  Hematologic: Negative  Skin: Negative    MEDICATIONS:   MEDICATIONS  (STANDING):  atorvastatin 20 milliGRAM(s) Oral at bedtime  cefoTEtan  IVPB      cefoTEtan  IVPB 2 Gram(s) IV Intermittent every 12 hours  chlorhexidine 4% Liquid 1 Application(s) Topical <User Schedule>  dextrose 5% + sodium chloride 0.45%. 1000 milliLiter(s) (75 mL/Hr) IV Continuous <Continuous>  dextrose 50% Injectable 50 milliLiter(s) IV Push every 15 minutes  influenza  Vaccine (HIGH DOSE) 0.7 milliLiter(s) IntraMuscular once  insulin lispro (ADMELOG) corrective regimen sliding scale   SubCutaneous three times a day before meals    MEDICATIONS  (PRN):  acetaminophen     Tablet .. 650 milliGRAM(s) Oral every 6 hours PRN Mild Pain (1 - 3)  morphine  - Injectable 2 milliGRAM(s) IV Push every 6 hours PRN Severe Pain (7 - 10)  morphine  - Injectable 1 milliGRAM(s) IV Push every 6 hours PRN Moderate Pain (4 - 6)  ondansetron Injectable 4 milliGRAM(s) IV Push once PRN Nausea      ALLERGIES:   Allergies    Jardiance (Unknown)    Intolerances        VITAL SIGNS:   Vital Signs Last 24 Hrs  T(C): 37.1 (04 Nov 2022 08:11), Max: 37.1 (04 Nov 2022 08:11)  T(F): 98.7 (04 Nov 2022 08:11), Max: 98.7 (04 Nov 2022 08:11)  HR: 82 (04 Nov 2022 08:11) (82 - 87)  BP: 114/83 (04 Nov 2022 08:11) (108/72 - 127/83)  BP(mean): --  RR: 18 (04 Nov 2022 08:11) (18 - 18)  SpO2: 93% (04 Nov 2022 08:11) (93% - 99%)    Parameters below as of 04 Nov 2022 08:11  Patient On (Oxygen Delivery Method): room air      I&O's Summary      PHYSICAL EXAM:   GENERAL:  No acute distress  HEENT:  NC/AT, conjunctiva clear, sclera anicteric  CHEST:  No increased effort, breath sounds clear  HEART:  Regular rhythm, S1, S2,   ABDOMEN:  + diffuse tenderness, Soft, non-distended, normoactive bowel sounds, no rebound or guarding  EXTREMITIES: No edema  SKIN:  Warm, dry  NEURO:  Calm, cooperative      LABS:  CBC Full  -  ( 04 Nov 2022 06:54 )  WBC Count : 4.93 K/uL  RBC Count : 4.10 M/uL  Hemoglobin : 12.6 g/dL  Hematocrit : 36.4 %  Platelet Count - Automated : 100 K/uL  Mean Cell Volume : 88.8 fl  Mean Cell Hemoglobin : 30.7 pg  Mean Cell Hemoglobin Concentration : 34.6 gm/dL  Auto Neutrophil # : 2.99 K/uL  Auto Lymphocyte # : 1.25 K/uL  Auto Monocyte # : 0.40 K/uL  Auto Eosinophil # : 0.26 K/uL  Auto Basophil # : 0.01 K/uL  Auto Neutrophil % : 60.6 %  Auto Lymphocyte % : 25.4 %  Auto Monocyte % : 8.1 %  Auto Eosinophil % : 5.3 %  Auto Basophil % : 0.2 %    11-04    139  |  101  |  <3.0<L>  ----------------------------<  143<H>  3.5   |  29.0  |  0.59    Ca    7.9<L>      04 Nov 2022 06:54  Phos  2.4     11-04  Mg     1.6     11-04    TPro  5.6<L>  /  Alb  2.9<L>  /  TBili  0.3<L>  /  DBili  x   /  AST  26  /  ALT  18  /  AlkPhos  54  11-04    LIVER FUNCTIONS - ( 04 Nov 2022 06:54 )  Alb: 2.9 g/dL / Pro: 5.6 g/dL / ALK PHOS: 54 U/L / ALT: 18 U/L / AST: 26 U/L / GGT: x                 Culture - Urine (collected 02 Nov 2022 09:36)  Source: Clean Catch Clean Catch (Midstream)  Final Report (03 Nov 2022 15:42):    <10,000 CFU/mL Normal Urogenital Anabel              Hepatitis C Virus S/CO Ratio: 0.06 S/CO (11-02-22 @ 06:00)      RADIOLOGY & ADDITIONAL STUDIES:      ACC: [id number] EXAM:  NM HEPATOBILIARY IMG                          PROCEDURE DATE:  11/04/2022        FINDINGS: There is prompt uptake of the injected radiotracer by the   hepatocytes. The gallbladder is first visualized at 25 minutes post   tracer injection and bowel activity by 30 minutes. There is normal tracer   clearance from the liver by the end of the study.    IMPRESSION: Normal hepatobiliary scan.    No scan evidence of acute cholecystitis.    --- End of Report ---            ACC: [id number] EXAM:  CT ABDOMEN AND PELVIS                          PROCEDURE DATE:  11/02/2022    LIVER: Within normal limits.  BILE DUCTS: Normal caliber.  GALLBLADDER: There is new subtle pericholecystic stranding with trace   fluid tracking along the right paracolic gutter and into Morison's pouch..  SPLEEN: Within normal limits.  PANCREAS: Within normal limits.  ADRENALS: Within normal limits.  KIDNEYS/URETERS: Within normal limits.      IMPRESSION: New subtle pericholecystic stranding and fluid tracking in   the right upper quadrant, suspicious for acute cholecystitis. Consider   right upper quadrant ultrasound for further initial evaluation. Results   discussed with Dr. Monique at time of interpretation.    --- End of Report ---        -- -- --   ACC: [id number] EXAM:  US ABDOMEN RT UPR QUADRANT                          PROCEDURE DATE:  11/02/2022        COMPARISON: CT abdomen pelvis 11/2/2022    FINDINGS: Technically limited exam due to patient's inability to   breath-hold.  Liver: Within normal limits.  Bile ducts: Normal caliber. Common bile duct measures .  Gallbladder: Tiny echogenic focus in the region of the gallbladder neck   without clear shadowing, possibly artifact. Normal gallbladder thickness   of 2 mm.. Sonographic Field's sign is negative. No pericholecystic fluid.  Pancreas: Visualized portions are within normal limits.  Right kidney: 9.9 cm. No hydronephrosis.  Ascites: None.  IVC: Visualized portions are within normal limits.    IMPRESSION:  Artifact versus tiny gallstone. No secondary signs of acute   cholecystitis. No pericholecystic fluid.        --- End of Report ---

## 2022-11-04 NOTE — PROGRESS NOTE ADULT - PROBLEM SELECTOR PLAN 1
CT 11/2/22 new subtle pericholecystic stranding and fluid tracking in RUQ suspicious for acute cholecystitis.   US abdomen 11/2/22 shows artifact vs tiny gallstone, no secondary signs of acute cholecystitis, no pericholecystic fluid.  Continues to have RUQ tenderness to palpation on PE and reports lower abdominal pain. HIDA on 11/4 Normal hepatobiliary scan. No scan evidence of acute cholecystitis. No leukocytosis, CBC stable, LFTs normal.     Plan:  If abdominal pain persist will plan for tentative EGD on Monday to r/o other source of pain.   Pain management   Trend CBC, LFTs   Antiemetic medications PRN for nausea

## 2022-11-04 NOTE — DISCHARGE NOTE NURSING/CASE MANAGEMENT/SOCIAL WORK - NSDCVIVACCINE_GEN_ALL_CORE_FT
Tdap; 25-Dec-2014 20:22; Mariah Chaidez (IRVIN); [employer]; [id number]; IntraMuscular; Deltoid Right.; 0.5 milliLiter(s);

## 2022-11-04 NOTE — PROGRESS NOTE ADULT - PROVIDER SPECIALTY LIST ADULT
Gastroenterology
Hospitalist
Hospitalist
Surgery
Endocrinology
Internal Medicine

## 2022-11-04 NOTE — PROGRESS NOTE ADULT - NUTRITIONAL ASSESSMENT
This patient has been assessed with a concern for Malnutrition and has been determined to have a diagnosis/diagnoses of Severe protein-calorie malnutrition.    This patient is being managed with:   Diet NPO-  Except Medications  Entered: Nov  3 2022  2:35PM    
This patient has been assessed with a concern for Malnutrition and has been determined to have a diagnosis/diagnoses of Severe protein-calorie malnutrition.    This patient is being managed with:   Diet NPO-  Except Medications  Entered: Nov  3 2022  2:35PM

## 2022-11-04 NOTE — PROGRESS NOTE ADULT - NS ATTEND AMEND GEN_ALL_CORE FT
Patient with  seen and examined with NP   Abdominal pain is improved  Tolerating solid diet  Patient may F/U with Dr Dockery in few weeks. Can do outpatient EGD Patient with  seen and examined with NP   Abdominal pain is improved  Tolerating solid diet  HIDa negative   Patient may F/U with Dr Dockery in few weeks. Can do outpatient EGD

## 2022-11-04 NOTE — DISCHARGE NOTE PROVIDER - NSDCCPCAREPLAN_GEN_ALL_CORE_FT
PRINCIPAL DISCHARGE DIAGNOSIS  Diagnosis: Starvation ketoacidosis  Assessment and Plan of Treatment:        PRINCIPAL DISCHARGE DIAGNOSIS  Diagnosis: DKA, type 2  Assessment and Plan of Treatment: Continue current medications as prescribed.  Follow up with primary doctor and endocrinology.      SECONDARY DISCHARGE DIAGNOSES  Diagnosis: Abdominal pain  Assessment and Plan of Treatment: Follow up with GI.     PRINCIPAL DISCHARGE DIAGNOSIS  Diagnosis: DKA, type 2  Assessment and Plan of Treatment: Please only continue januvia upon discharge.  Monitor fingersticks three times a day and keep a log of your recordings for your next doctors visit.  Follow up with primary doctor and endocrinology.      SECONDARY DISCHARGE DIAGNOSES  Diagnosis: Abdominal pain  Assessment and Plan of Treatment: improved  start protonix as directed  your CT abdomen and ultrasound were negative for acute cholecytitis  However, you will need to follow up with gastroenterology for an outpatient EGD    Diagnosis: Hypertension  Assessment and Plan of Treatment: continue lisinopril but hold if your SBP  is less than 100  follow up with your PCP within 1 week    Diagnosis: HLD (hyperlipidemia)  Assessment and Plan of Treatment: continue statin     PRINCIPAL DISCHARGE DIAGNOSIS  Diagnosis: DKA, type 2  Assessment and Plan of Treatment: Please only continue januvia upon discharge.  Monitor fingersticks three times a day and keep a log of your recordings for your next doctors visit.  Follow up with primary doctor and endocrinology.      SECONDARY DISCHARGE DIAGNOSES  Diagnosis: Abdominal pain  Assessment and Plan of Treatment: improved  start protonix as directed  your CT abdomen and ultrasound were negative for acute cholecytitis  However, you will need to follow up with gastroenterology for an outpatient EGD    Diagnosis: Hypertension  Assessment and Plan of Treatment: continue lisinopril but hold if your SBP  is less than 100  follow up with your PCP within 1 week    Diagnosis: HLD (hyperlipidemia)  Assessment and Plan of Treatment: continue statin    Diagnosis: COVID-19 virus antibody detected  Assessment and Plan of Treatment: asymptomatic  adhere to quarantine guidelines x 5 days  follow up with your PCP within 1 week

## 2022-11-04 NOTE — DISCHARGE NOTE NURSING/CASE MANAGEMENT/SOCIAL WORK - PATIENT PORTAL LINK FT
You can access the FollowMyHealth Patient Portal offered by [facility] by registering at the following website: [email or web address]. By joining [employer]’s FollowMyHealth portal, you will also be able to view your health information using other applications (apps) compatible with our system.

## 2022-11-05 LAB — GAD65 AB SER-MCNC: 0.02 NMOL/L — SIGNIFICANT CHANGE UP

## 2022-11-08 ENCOUNTER — APPOINTMENT (OUTPATIENT)
Dept: ENDOCRINOLOGY | Facility: CLINIC | Age: 67
End: 2022-11-08

## 2022-11-08 VITALS
WEIGHT: 92 LBS | SYSTOLIC BLOOD PRESSURE: 100 MMHG | HEIGHT: 55 IN | BODY MASS INDEX: 21.29 KG/M2 | DIASTOLIC BLOOD PRESSURE: 60 MMHG

## 2022-11-08 DIAGNOSIS — Z78.9 OTHER SPECIFIED HEALTH STATUS: ICD-10-CM

## 2022-11-08 PROCEDURE — 99214 OFFICE O/P EST MOD 30 MIN: CPT | Mod: 25

## 2022-11-08 PROCEDURE — 82962 GLUCOSE BLOOD TEST: CPT

## 2022-11-08 RX ORDER — EMPAGLIFLOZIN 10 MG/1
10 TABLET, FILM COATED ORAL
Qty: 90 | Refills: 0 | Status: DISCONTINUED | COMMUNITY
Start: 2022-07-28 | End: 2022-11-08

## 2022-11-08 NOTE — REVIEW OF SYSTEMS
[Diarrhea] : diarrhea [Fatigue] : no fatigue [Recent Weight Gain (___ Lbs)] : no recent weight gain [Recent Weight Loss (___ Lbs)] : no recent weight loss [Visual Field Defect] : no visual field defect [Blurred Vision] : no blurred vision [Dysphagia] : no dysphagia [Neck Pain] : no neck pain [Dysphonia] : no dysphonia [Chest Pain] : no chest pain [Shortness Of Breath] : no shortness of breath [Constipation] : no constipation [Polyuria] : no polyuria [Polydipsia] : no polydipsia

## 2022-11-08 NOTE — HISTORY OF PRESENT ILLNESS
[FreeTextEntry1] : HFU: Patient is a 67 year old female with DM (on Jardiance) admitted to ICU for DKA. \par Started on insulin ggt with improvement in AG. Now transferred to Medicine for \par further management. Endocrinology followed, recommended only Januvia on \par discharge.  Patient was also found to be COVID positive.  Patient asymptomatic \par and didn't require treatment.  Continued to complain of abdominal pain. LFTs \par WNL. CT abdomen with concerns for acute cholecystitis, but US was negative.  GI \par and surgery consulted.  HIDA scan negative for acute cholecystitis.  Diet \par advanced.  Patient tolerating PO diet and an outpatient EGD was recommended per \par GI. Patient is medically stable for discharge home at this time. \par \par Here with brother- Micaela - he is primary caretaker, lives with pt\par \par Here complaining of diarrhea, a lot of nerves to be here. Plans to follow with GI \par \par T2DM\par Previously controlled by PCP\par Severity: well controlled \par Onset: approx 10 years ago \par Modifying Factors: did not leave the hospital on insulin\par \par SMBG\par Checks blood sugars randomly\par On average fastings 100s \par On average 2-3 hours post dinners, 200s \par \par FS in office 157- did eat breakfast\par \par Last HGA1C 6.8 - 11/2022\par \par Current drug regimen\par Januvia 100 mg daily \par \par Cannot ever use SLGT2 again- caused euglycemic DKA \par Previously was on glipizide and it caused hypoglycemia \par MFN- diarrhea\par \par Eye exam: Summer 2022- denies DR\par Foot exam: Goes to podiatrist \par Kidney disease: denies \par Heart disease: denies \par \par Weight: stable\par Diet- dinner is largest meal of the day\par B- oatmeal and coffee\par L- skips lunch\par D- chicken, salad, potato salad \par Drinks water \par Exercise: does not exercise \par Smoking: denies \par \par HTN\par BP in office 100/60\par Lisinopril 2.5 mg daily \par \par HLD\par On statin- atorvastatin 20 mg daily \par \par Pt was COVID + in hospital but is now out of quarantine \par

## 2022-11-08 NOTE — PHYSICAL EXAM
[Alert] : alert [Well Nourished] : well nourished [No Acute Distress] : no acute distress [Normal Sclera/Conjunctiva] : normal sclera/conjunctiva [Normal Hearing] : hearing was normal [Thyroid Not Enlarged] : the thyroid was not enlarged [No Accessory Muscle Use] : no accessory muscle use [Clear to Auscultation] : lungs were clear to auscultation bilaterally [Normal S1, S2] : normal S1 and S2 [Normal Rate] : heart rate was normal [No Edema] : no peripheral edema [Not Tender] : non-tender [Soft] : abdomen soft [Normal Gait] : normal gait [No Rash] : no rash [No Tremors] : no tremors [de-identified] : +developmentally delayed

## 2022-11-08 NOTE — ASSESSMENT
[FreeTextEntry1] : T2DM\par -Pt presents to office after a hospital visit for euglycemic DKA when on Jardiance. Her fasting sugars appear to meet goal on recall but her post dinner blood sugars are high.\par -Continue on Januvia 100 mg daily\par -Begin Prandin 0.5 mg with dinner\par -Continue to check blood sugars at least 2 x aday and bring logs to next visit\par -Pt and brother not interested in CDE at this time\par -Continue to follow up with all specialists including ophtho \par \par HTN\par -BP stable in office, continue regimen\par \par HLD\par -Need updated fasting lipid panel\par \par RTO 6 weeks NP

## 2022-11-09 LAB
CULTURE RESULTS: SIGNIFICANT CHANGE UP
CULTURE RESULTS: SIGNIFICANT CHANGE UP
SPECIMEN SOURCE: SIGNIFICANT CHANGE UP
SPECIMEN SOURCE: SIGNIFICANT CHANGE UP

## 2022-11-23 ENCOUNTER — APPOINTMENT (OUTPATIENT)
Dept: GASTROENTEROLOGY | Facility: CLINIC | Age: 67
End: 2022-11-23

## 2022-11-23 VITALS
RESPIRATION RATE: 16 BRPM | BODY MASS INDEX: 21.29 KG/M2 | WEIGHT: 92 LBS | HEART RATE: 80 BPM | DIASTOLIC BLOOD PRESSURE: 70 MMHG | OXYGEN SATURATION: 98 % | HEIGHT: 55 IN | SYSTOLIC BLOOD PRESSURE: 100 MMHG

## 2022-11-23 PROCEDURE — 99214 OFFICE O/P EST MOD 30 MIN: CPT

## 2022-11-23 NOTE — REASON FOR VISIT
[Post Hospitalization] : a post hospitalization visit [FreeTextEntry1] : Recent bout of abdominal pain and DKA on 11/1.

## 2022-11-23 NOTE — HISTORY OF PRESENT ILLNESS
[FreeTextEntry1] : 67-year-old white female with history of type 2 diabetes hypertension hyperlipidemia, and scoliosis,  who was recently admitted to [facility] for DKA thought to be medication related to use of Jardiance.  Also found to have COVID infection on 11/1.  Patient was hospitalized from 11/1 through 11/4/22.  Initially treated in ICU for DKA with hydration and insulin infusions.  Syndrome had been developing for multiple days prior to admission.  Has medical history remarkable for IBS with diarrhea.  Past surgical history: none.  Jardiance was discontinued in the hospital and Januvia was started.  Metformin and glipizide were discontinued.  Patient alleges that a colonoscopy was performed 10 years ago and found to be negative.  Not using NSAIDs or aspirin.  Therefore no suspicion for peptic ulcer disease.  Family history is positive for colon polyp and a brother.  Negative for colon cancer.  Patient was seen in-house by GI.  CAT scan abdomen and pelvis had suggested subtle findings of cholecystitis.  A subsequent sonogram of the abdomen failed to show evidence of gallstones or acute cholecystitis.  HIDA scan was normal.  Symptoms improved rapidly and diet was advanced and tolerated.  Due to positive COVID the GI team did not perform any procedures.

## 2022-11-23 NOTE — PHYSICAL EXAM
[Alert] : alert [Normal Voice/Communication] : normal voice/communication [Healthy Appearing] : healthy appearing [No Acute Distress] : no acute distress [Sclera] : the sclera and conjunctiva were normal [Hearing Threshold Finger Rub Not [place]] : hearing was normal [Normal Lips/Gums] : the lips and gums were normal [Oropharynx] : the oropharynx was normal [Normal Appearance] : the appearance of the neck was normal [No Neck Mass] : no neck mass was observed [No Respiratory Distress] : no respiratory distress [No Acc Muscle Use] : no accessory muscle use [Respiration, Rhythm And Depth] : normal respiratory rhythm and effort [Auscultation Breath Sounds / Voice Sounds] : lungs were clear to auscultation bilaterally [Heart Rate And Rhythm] : heart rate was normal and rhythm regular [Normal S1, S2] : normal S1 and S2 [Murmurs] : no murmurs [Bowel Sounds] : normal bowel sounds [Abdomen Tenderness] : non-tender [No Masses] : no abdominal mass palpated [Abdomen Soft] : soft [] : no hepatosplenomegaly [Oriented To Time, Place, And Person] : oriented to person, place, and time [de-identified] : Small build, thin, not cachectic.  Not toxic appearing. [de-identified] : Moderate distention.  Vague diffuse abdominal fullness.  Vague diffuse abdominal discomfort.  All sounds normal.  No scars.  No organomegaly.  No mass or rebound.

## 2022-11-23 NOTE — CONSULT LETTER
[Dear  ___] : Dear  [unfilled], [Consult Letter:] : I had the pleasure of evaluating your patient, [unfilled]. [Please see my note below.] : Please see my note below. [Consult Closing:] : Thank you very much for allowing me to participate in the care of this patient.  If you have any questions, please do not hesitate to contact me. [Sincerely,] : Sincerely, [FreeTextEntry1] : Recent bout of DKA possibly medication related.  Still with persistent vague diffuse upper abdominal pain.  Cholecystitis previously excluded.  Upper endoscopy arranged.\par May need repeat imaging if endoscopy does not find the etiology. [FreeTextEntry3] : Rick Senior MD FACG\par Diplomate American Board of Internal Medicine and Gastroenterolgy\par [facility]\par

## 2022-11-23 NOTE — ASSESSMENT
[FreeTextEntry1] : Vague diffuse upper abdominal pain.  Recent bout of DKA possibly medication related.  No obvious pancreatitis.  No obvious GI bleeding.  Now 3 weeks post admission with persistent vague diffuse abdominal pain and distention.  Unclear etiology.  Would expect that Jardiance effect should have already resolved.\par We will plan for upper endoscopy in near future to exclude benign peptic ulcer disease H. pylori or chronic gastritis.  Endoscopy, the procedure, its risk benefits and prep, were explained to patient and  who understand and are agreeable to proceed.  ASA #2.  Mallampati #2.  Patient is in optimal medical condition to undergo planned procedure.  Arrangements made.  Results to follow.  Repeat blood work should not be necessary.

## 2022-12-21 ENCOUNTER — TRANSCRIPTION ENCOUNTER (OUTPATIENT)
Age: 67
End: 2022-12-21

## 2022-12-22 ENCOUNTER — RESULT REVIEW (OUTPATIENT)
Age: 67
End: 2022-12-22

## 2022-12-22 ENCOUNTER — OUTPATIENT (OUTPATIENT)
Dept: OUTPATIENT SERVICES | Facility: HOSPITAL | Age: 67
LOS: 1 days | End: 2022-12-22
Payer: MEDICARE

## 2022-12-22 ENCOUNTER — APPOINTMENT (OUTPATIENT)
Dept: GASTROENTEROLOGY | Facility: GI CENTER | Age: 67
End: 2022-12-22

## 2022-12-22 DIAGNOSIS — Z87.19 PERSONAL HISTORY OF OTHER DISEASES OF THE DIGESTIVE SYSTEM: ICD-10-CM

## 2022-12-22 DIAGNOSIS — Z98.89 OTHER SPECIFIED POSTPROCEDURAL STATES: Chronic | ICD-10-CM

## 2022-12-22 LAB — GLUCOSE BLDC GLUCOMTR-MCNC: 127 MG/DL — HIGH (ref 70–99)

## 2022-12-22 PROCEDURE — 88305 TISSUE EXAM BY PATHOLOGIST: CPT | Mod: 26

## 2022-12-22 PROCEDURE — 88342 IMHCHEM/IMCYTCHM 1ST ANTB: CPT

## 2022-12-22 PROCEDURE — 43239 EGD BIOPSY SINGLE/MULTIPLE: CPT

## 2022-12-22 PROCEDURE — 88342 IMHCHEM/IMCYTCHM 1ST ANTB: CPT | Mod: 26

## 2022-12-22 PROCEDURE — 82962 GLUCOSE BLOOD TEST: CPT

## 2022-12-22 PROCEDURE — 88305 TISSUE EXAM BY PATHOLOGIST: CPT

## 2022-12-22 NOTE — PHYSICAL EXAM
[Alert] : alert [Normal Voice/Communication] : normal voice/communication [Healthy Appearing] : healthy appearing [No Acute Distress] : no acute distress [Sclera] : the sclera and conjunctiva were normal [Hearing Threshold Finger Rub Not [place]] : hearing was normal [Normal Lips/Gums] : the lips and gums were normal [Oropharynx] : the oropharynx was normal [Normal Appearance] : the appearance of the neck was normal [No Neck Mass] : no neck mass was observed [No Respiratory Distress] : no respiratory distress [No Acc Muscle Use] : no accessory muscle use [Respiration, Rhythm And Depth] : normal respiratory rhythm and effort [Auscultation Breath Sounds / Voice Sounds] : lungs were clear to auscultation bilaterally [Heart Rate And Rhythm] : heart rate was normal and rhythm regular [Normal S1, S2] : normal S1 and S2 [Murmurs] : no murmurs [Bowel Sounds] : normal bowel sounds [Abdomen Tenderness] : non-tender [No Masses] : no abdominal mass palpated [Abdomen Soft] : soft [] : no hepatosplenomegaly [Oriented To Time, Place, And Person] : oriented to person, place, and time

## 2022-12-22 NOTE — HISTORY OF PRESENT ILLNESS
[FreeTextEntry1] : 68 yo WF c DM, HTN, HLD, and recent bout of DKA; teated and resolved.  Thought to be medication induced.  Had Upper abd pain c N/V.  No GB disease.   No NSAIDs.  No PUD hx.  No new medical issues.

## 2022-12-28 LAB — SURGICAL PATHOLOGY STUDY: SIGNIFICANT CHANGE UP

## 2022-12-30 ENCOUNTER — APPOINTMENT (OUTPATIENT)
Dept: ENDOCRINOLOGY | Facility: CLINIC | Age: 67
End: 2022-12-30
Payer: MEDICARE

## 2022-12-30 VITALS
SYSTOLIC BLOOD PRESSURE: 102 MMHG | DIASTOLIC BLOOD PRESSURE: 72 MMHG | OXYGEN SATURATION: 99 % | HEART RATE: 68 BPM | HEIGHT: 55 IN | WEIGHT: 93 LBS | BODY MASS INDEX: 21.52 KG/M2

## 2022-12-30 LAB
GLUCOSE BLDC GLUCOMTR-MCNC: 62
GLUCOSE BLDC GLUCOMTR-MCNC: 68

## 2022-12-30 PROCEDURE — 82962 GLUCOSE BLOOD TEST: CPT

## 2022-12-30 PROCEDURE — 99214 OFFICE O/P EST MOD 30 MIN: CPT | Mod: 25

## 2022-12-30 NOTE — HISTORY OF PRESENT ILLNESS
[FreeTextEntry1] : Here with brother- Micaela - he is primary caretaker, lives with pt\par \par Here complaining of diarrhea. S/P EGD on 12/22. On protonix.\par \par T2DM\par Previously controlled by PCP\par Severity: well controlled \par Onset: approx 10 years ago \par Modifying Factors: did not leave the hospital on insulin\par \par SMBG\par Checks blood sugars randomly\par On average fastings 90s, low 100s \par On average 2-3 hours post dinners, 100s and 200s \par \par FS in office 62(doesn’t like to eat when she leaves the house bc she doesn’t want to have diarrhea)- given juice/cookies , blood sugar 10 mins later 68 (pt going home to eat lunch) . Pt brother to check again at home and will call office if any issues . \par \par Last HGA1C 6.8 - 11/2022\par \par Current drug regimen\par Januvia 100 mg daily \par Prandin 0.5 mg with dinner \par \par Cannot ever use SLGT2 again- caused euglycemic DKA \par Previously was on glipizide and it caused hypoglycemia \par MFN- diarrhea\par \par Eye exam: Summer 2022- denies DR\par Foot exam: Goes to podiatrist \par Kidney disease: denies \par Heart disease: denies \par \par Weight: stable\par Diet- dinner is largest meal of the day\par B- oatmeal and coffee\par L- skips lunch\par D- chicken, salad, potato salad \par Drinks water \par Exercise: does not exercise \par Smoking: denies \par \par HTN\par BP in office 102/72\par Lisinopril 2.5 mg daily \par \par HLD\par On statin- atorvastatin 20 mg daily \par

## 2022-12-30 NOTE — REVIEW OF SYSTEMS
[Diarrhea] : diarrhea [Fatigue] : no fatigue [Recent Weight Gain (___ Lbs)] : no recent weight gain no [Recent Weight Loss (___ Lbs)] : no recent weight loss [Visual Field Defect] : no visual field defect [Blurred Vision] : no blurred vision [Neck Pain] : no neck pain [Dysphonia] : no dysphonia [Chest Pain] : no chest pain [Shortness Of Breath] : no shortness of breath [Constipation] : no constipation [Polyuria] : no polyuria [Polydipsia] : no polydipsia

## 2022-12-30 NOTE — PHYSICAL EXAM
[Alert] : alert [Well Nourished] : well nourished [No Acute Distress] : no acute distress [Normal Sclera/Conjunctiva] : normal sclera/conjunctiva [Normal Hearing] : hearing was normal [Thyroid Not Enlarged] : the thyroid was not enlarged [No Accessory Muscle Use] : no accessory muscle use [Clear to Auscultation] : lungs were clear to auscultation bilaterally [Normal S1, S2] : normal S1 and S2 [Normal Rate] : heart rate was normal [No Edema] : no peripheral edema [Not Tender] : non-tender [Soft] : abdomen soft [Normal Gait] : normal gait [No Rash] : no rash [No Tremors] : no tremors [de-identified] : +developmentally delayed

## 2022-12-30 NOTE — ASSESSMENT
[FreeTextEntry1] : T2DM\par -Pt presents to office after a hospital visit for euglycemic DKA when on Jardiance. Her fasting sugars appear to meet goal on recall but her post dinner blood sugars are high.\par -Continue on Januvia 100 mg daily\par -Continue Prandin 0.5 mg with dinner\par -Continue to check blood sugars at least 2 x a day and bring logs to next visit. Should now be checking mid day pre lunch as her blood sugar in office was low and this may be happening at home/more often. \par -Pt and brother not interested in CDE at this time\par -Continue to follow up with all specialists including ophtho \par \par HTN\par -BP stable in office, continue regimen\par \par HLD\par -Need updated fasting lipid panel\par \par Fasting labs due 2/2023\par RTO 6 weeks NP

## 2023-02-01 ENCOUNTER — APPOINTMENT (OUTPATIENT)
Dept: MAMMOGRAPHY | Facility: CLINIC | Age: 68
End: 2023-02-01
Payer: MEDICARE

## 2023-02-01 ENCOUNTER — OUTPATIENT (OUTPATIENT)
Dept: OUTPATIENT SERVICES | Facility: HOSPITAL | Age: 68
LOS: 1 days | End: 2023-02-01
Payer: MEDICARE

## 2023-02-01 DIAGNOSIS — Z12.31 ENCOUNTER FOR SCREENING MAMMOGRAM FOR MALIGNANT NEOPLASM OF BREAST: ICD-10-CM

## 2023-02-01 DIAGNOSIS — Z98.89 OTHER SPECIFIED POSTPROCEDURAL STATES: Chronic | ICD-10-CM

## 2023-02-01 PROCEDURE — 77063 BREAST TOMOSYNTHESIS BI: CPT | Mod: 26

## 2023-02-01 PROCEDURE — 77063 BREAST TOMOSYNTHESIS BI: CPT

## 2023-02-01 PROCEDURE — 77067 SCR MAMMO BI INCL CAD: CPT | Mod: 26

## 2023-02-01 PROCEDURE — 77067 SCR MAMMO BI INCL CAD: CPT

## 2023-02-10 ENCOUNTER — LABORATORY RESULT (OUTPATIENT)
Age: 68
End: 2023-02-10

## 2023-02-17 ENCOUNTER — APPOINTMENT (OUTPATIENT)
Dept: ENDOCRINOLOGY | Facility: CLINIC | Age: 68
End: 2023-02-17
Payer: MEDICARE

## 2023-02-17 VITALS
HEART RATE: 81 BPM | DIASTOLIC BLOOD PRESSURE: 72 MMHG | HEIGHT: 55 IN | WEIGHT: 98 LBS | OXYGEN SATURATION: 98 % | BODY MASS INDEX: 22.68 KG/M2 | SYSTOLIC BLOOD PRESSURE: 108 MMHG

## 2023-02-17 LAB — GLUCOSE BLDC GLUCOMTR-MCNC: 273

## 2023-02-17 PROCEDURE — 99214 OFFICE O/P EST MOD 30 MIN: CPT | Mod: 25

## 2023-02-17 PROCEDURE — 82962 GLUCOSE BLOOD TEST: CPT

## 2023-02-17 NOTE — ASSESSMENT
[FreeTextEntry1] : T2DM\par -Pt presents to office after a hospital visit for euglycemic DKA when on Jardiance. Her fasting sugars appear to meet goal on recall but her post dinner blood sugars are high.\par -Continue on Januvia 100 mg daily\par -Continue Prandin 0.5 mg with dinner\par -Continue to check blood sugars at least 2 x a day and bring logs to next visit. Should now be checking mid day pre lunch as her blood sugar in office was low and this may be happening at home/more often. \par -Pt and brother not interested in CDE at this time\par -Continue to follow up with all specialists including ophtho \par \par HTN\par -BP stable in office, continue regimen\par \par HLD\par -Fasting lipid panel WNL, continue statin \par \par Fasting labs due 5/2023\par RTO 3 months NP

## 2023-02-17 NOTE — REVIEW OF SYSTEMS
[Fatigue] : no fatigue [Recent Weight Gain (___ Lbs)] : no recent weight gain [Recent Weight Loss (___ Lbs)] : no recent weight loss [Visual Field Defect] : no visual field defect [Blurred Vision] : no blurred vision [Dysphagia] : no dysphagia [Neck Pain] : no neck pain [Dysphonia] : no dysphonia [Chest Pain] : no chest pain [Shortness Of Breath] : no shortness of breath [Diarrhea] : diarrhea [Polyuria] : no polyuria [Polydipsia] : no polydipsia

## 2023-02-17 NOTE — PHYSICAL EXAM
[Alert] : alert [Well Nourished] : well nourished [No Acute Distress] : no acute distress [Normal Sclera/Conjunctiva] : normal sclera/conjunctiva [Normal Hearing] : hearing was normal [Thyroid Not Enlarged] : the thyroid was not enlarged [No Accessory Muscle Use] : no accessory muscle use [Clear to Auscultation] : lungs were clear to auscultation bilaterally [Normal S1, S2] : normal S1 and S2 [Normal Rate] : heart rate was normal [No Edema] : no peripheral edema [Not Tender] : non-tender [Soft] : abdomen soft [Normal Gait] : normal gait [No Rash] : no rash [No Tremors] : no tremors [de-identified] : +developmentally delayed

## 2023-02-17 NOTE — HISTORY OF PRESENT ILLNESS
[FreeTextEntry1] : Here with brother- Micaela - he is primary caretaker, lives with pt\par \par Here complaining of diarrhea. S/P EGD on 12/22. On protonix. Has issues with incontinence. \par \par T2DM\par Previously controlled by PCP\par Severity: well controlled \par Onset: approx 10 years ago \par Modifying Factors: did not leave the hospital on insulin\par \par SMBG\par Checks blood sugars randomly\par On average fastings 90s, low 100s \par On average 2-3 hours post dinners, 100s and 200s \par FS in office 273 but hasn’t taken her medication yet\par \par Last HGA1C 6.3-2/2022\par \par Current drug regimen\par Januvia 100 mg daily \par Prandin 0.5 mg with dinner \par \par Cannot ever use SLGT2 again- caused euglycemic DKA \par Previously was on glipizide and it caused hypoglycemia \par MFN- diarrhea\par \par Eye exam: Summer 2022- denies DR\par Foot exam: Goes to podiatrist \par Kidney disease: denies \par Heart disease: denies \par \par Weight: stable\par Diet- dinner is largest meal of the day\par B- oatmeal and coffee\par L- skips lunch\par D- chicken, salad, potato salad \par Drinks water \par Exercise: does not exercise \par Smoking: denies \par \par HTN\par BP in office 108/72\par Lisinopril 2.5 mg daily \par \par HLD\par On statin- atorvastatin 20 mg daily \par Lipid panel at goal\par \par Low PLTS on labs- not new for pt

## 2023-03-14 ENCOUNTER — OFFICE (OUTPATIENT)
Dept: URBAN - METROPOLITAN AREA CLINIC 40 | Facility: CLINIC | Age: 68
Setting detail: OPHTHALMOLOGY
End: 2023-03-14
Payer: MEDICARE

## 2023-03-14 ENCOUNTER — APPOINTMENT (OUTPATIENT)
Dept: GASTROENTEROLOGY | Facility: CLINIC | Age: 68
End: 2023-03-14
Payer: MEDICARE

## 2023-03-14 VITALS
WEIGHT: 95 LBS | HEART RATE: 85 BPM | TEMPERATURE: 97.8 F | OXYGEN SATURATION: 98 % | RESPIRATION RATE: 14 BRPM | HEIGHT: 55 IN | DIASTOLIC BLOOD PRESSURE: 82 MMHG | SYSTOLIC BLOOD PRESSURE: 126 MMHG | BODY MASS INDEX: 21.98 KG/M2

## 2023-03-14 DIAGNOSIS — E11.3292: ICD-10-CM

## 2023-03-14 DIAGNOSIS — Z86.39 PERSONAL HISTORY OF OTHER ENDOCRINE, NUTRITIONAL AND METABOLIC DISEASE: ICD-10-CM

## 2023-03-14 DIAGNOSIS — E11.3293: ICD-10-CM

## 2023-03-14 DIAGNOSIS — H43.811: ICD-10-CM

## 2023-03-14 DIAGNOSIS — E11.3291: ICD-10-CM

## 2023-03-14 DIAGNOSIS — H25.13: ICD-10-CM

## 2023-03-14 PROCEDURE — 99214 OFFICE O/P EST MOD 30 MIN: CPT

## 2023-03-14 PROCEDURE — 92014 COMPRE OPH EXAM EST PT 1/>: CPT | Performed by: OPHTHALMOLOGY

## 2023-03-14 PROCEDURE — 92250 FUNDUS PHOTOGRAPHY W/I&R: CPT | Performed by: OPHTHALMOLOGY

## 2023-03-14 RX ORDER — CLOTRIMAZOLE 10 MG/ML
1 SOLUTION TOPICAL
Qty: 30 | Refills: 0 | Status: DISCONTINUED | COMMUNITY
Start: 2022-09-19 | End: 2023-03-14

## 2023-03-14 RX ORDER — CLOTRIMAZOLE 10 MG/G
1 CREAM TOPICAL
Qty: 30 | Refills: 0 | Status: DISCONTINUED | COMMUNITY
Start: 2022-09-19 | End: 2023-03-14

## 2023-03-14 ASSESSMENT — REFRACTION_AUTOREFRACTION
OS_AXIS: 110
OS_CYLINDER: -1.00
OS_SPHERE: +0.25
OD_AXIS: 074
OD_SPHERE: -2.75
OD_CYLINDER: -3.50

## 2023-03-14 ASSESSMENT — REFRACTION_CURRENTRX
OS_ADD: +2.50
OD_AXIS: 52
OS_SPHERE: +0.75
OS_CYLINDER: -1.25
OD_OVR_VA: 20/
OS_OVR_VA: 20/
OS_AXIS: 117
OD_SPHERE: PL
OD_CYLINDER: -2.00
OD_ADD: +2.50

## 2023-03-14 ASSESSMENT — SPHEQUIV_DERIVED
OS_SPHEQUIV: -0.25
OD_SPHEQUIV: -4.5

## 2023-03-14 ASSESSMENT — TONOMETRY
OS_IOP_MMHG: 18
OD_IOP_MMHG: 18

## 2023-03-14 ASSESSMENT — KERATOMETRY
OS_AXISANGLE_DEGREES: 073
OD_K2POWER_DIOPTERS: 51.75
OD_K1POWER_DIOPTERS: 51.25
METHOD_AUTO_MANUAL: AUTO
OD_AXISANGLE_DEGREES: 164
OS_K1POWER_DIOPTERS: 40.25
OS_K2POWER_DIOPTERS: 50.75

## 2023-03-14 ASSESSMENT — AXIALLENGTH_DERIVED
OS_AL: 22.9714
OD_AL: 22.46

## 2023-03-14 ASSESSMENT — CONFRONTATIONAL VISUAL FIELD TEST (CVF)
OS_FINDINGS: FULL
OD_FINDINGS: FULL

## 2023-03-14 ASSESSMENT — VISUAL ACUITY
OS_BCVA: 20/350
OD_BCVA: 20/80

## 2023-03-15 PROBLEM — Z86.39: Status: RESOLVED | Noted: 2022-11-08 | Resolved: 2023-03-15

## 2023-03-15 NOTE — ASSESSMENT
[FreeTextEntry1] : Large hiatus hernia identified on endoscopy.  No Pedersen's esophagus.  No H. pylori.  Prior imaging for evaluation of abdominal pain had suggested possible gallstones and possible acute cholecystitis on CAT scan.  Currently no upper abdominal complaints or findings in upper abdomen on physical exam.\par Notable R lower abdominal tenderness diffusely of unclear etiology.  No recent UTIs.  No bowel complaints.\par No evidence for Pedersen's esophagus.  Seemingly minimally symptomatic from large hiatus hernia.\par Unclear etiology for lower abdominal tenderness.\par \par Plan reduce pantoprazole from 40 to 20 mg/day given small frame and stature and build.  GI office reevaluate here in 4 months.  Performed blood work prior to next office visit.

## 2023-03-15 NOTE — PHYSICAL EXAM
[Alert] : alert [Normal Voice/Communication] : normal voice/communication [Healthy Appearing] : healthy appearing [No Acute Distress] : no acute distress [Sclera] : the sclera and conjunctiva were normal [Hearing Threshold Finger Rub Not [place]] : hearing was normal [Normal Lips/Gums] : the lips and gums were normal [Oropharynx] : the oropharynx was normal [Normal Appearance] : the appearance of the neck was normal [No Neck Mass] : no neck mass was observed [No Respiratory Distress] : no respiratory distress [No Acc Muscle Use] : no accessory muscle use [Respiration, Rhythm And Depth] : normal respiratory rhythm and effort [Auscultation Breath Sounds / Voice Sounds] : lungs were clear to auscultation bilaterally [Heart Rate And Rhythm] : heart rate was normal and rhythm regular [Normal S1, S2] : normal S1 and S2 [Murmurs] : no murmurs [Bowel Sounds] : normal bowel sounds [No Masses] : no abdominal mass palpated [Abdomen Soft] : soft [] : no hepatosplenomegaly [Oriented To Time, Place, And Person] : oriented to person, place, and time [de-identified] : Diffuse lower abdominal tenderness to palpation.  No guarding or rebound.  No distention.

## 2023-03-15 NOTE — HISTORY OF PRESENT ILLNESS
[FreeTextEntry1] : 67-year-old white female with history of hypertension hyperlipidemia and diabetes with recent bout of medication induced DKA hospitalized for same.  Had abdominal pain at that time of unclear etiology.  Probably not gallbladder related.  EGD done 12/22 showed a large hiatus hernia but no Pedersen's esophagus on biopsies.  Stomach was normal.  Gastric biopsies negative for H. pylori or intestinal metaplasia.  \par Patient is accompanied by brother who serves as .  Patient is of small stature identified feet.  He appears to have chronic malnutrition.  He alleges to be asymptomatic.  Liver diffuse lower abdominal tenderness is identified on physical exam.  Appetite is okay.  Bowel movements range from being loose to normal.  Alleges that a colonoscopy was performed about 10+ years ago that was negative.\par  [de-identified] : 12/22/2022 EGD, or FK.  Large hiatus hernia 4 cm.  Otherwise normal stomach and duodenum.  Multiple gastric biopsies negative for H. pylori or intestinal metaplasia.  Esophageal biopsies revealed only normal gastric mucosa.  No dysplasia or Pedersen's.

## 2023-03-15 NOTE — REASON FOR VISIT
[Follow-up] : a follow-up of an existing diagnosis [FreeTextEntry1] : History of abdominal pain unclear etiology.  Prior DKA.

## 2023-04-12 ENCOUNTER — OFFICE (OUTPATIENT)
Dept: URBAN - METROPOLITAN AREA CLINIC 40 | Facility: CLINIC | Age: 68
Setting detail: OPHTHALMOLOGY
End: 2023-04-12
Payer: MEDICARE

## 2023-04-12 DIAGNOSIS — H25.13: ICD-10-CM

## 2023-04-12 DIAGNOSIS — H43.811: ICD-10-CM

## 2023-04-12 DIAGNOSIS — E11.3292: ICD-10-CM

## 2023-04-12 DIAGNOSIS — E11.3291: ICD-10-CM

## 2023-04-12 DIAGNOSIS — H25.11: ICD-10-CM

## 2023-04-12 PROBLEM — H25.12 CATARACT SENILE NUCLEAR SCLEROSIS; RIGHT EYE, LEFT EYE, BOTH EYES: Status: ACTIVE | Noted: 2023-04-12

## 2023-04-12 PROCEDURE — 92136 OPHTHALMIC BIOMETRY: CPT | Performed by: OPHTHALMOLOGY

## 2023-04-12 PROCEDURE — 92134 CPTRZ OPH DX IMG PST SGM RTA: CPT | Performed by: OPHTHALMOLOGY

## 2023-04-12 PROCEDURE — 92012 INTRM OPH EXAM EST PATIENT: CPT | Performed by: OPHTHALMOLOGY

## 2023-04-12 ASSESSMENT — AXIALLENGTH_DERIVED
OD_AL: 22.46
OS_AL: 21.4261

## 2023-04-12 ASSESSMENT — KERATOMETRY
OD_K2POWER_DIOPTERS: 51.50
OS_CYLPOWER_DEGREES: 0.25
OS_CYLAXISANGLE_DEGREES: 057
OD_CYLPOWER_DEGREES: 0.25
OS_K2POWER_DIOPTERS: 50.25
OD_K1K2_AVERAGE: 51.38
OD_K1POWER_DIOPTERS: 51.25
OS_K1K2_AVERAGE: 50.12
OD_AXISANGLE2_DEGREES: 133
OD_CYLAXISANGLE_DEGREES: 133
OD_K1POWER_DIOPTERS: 51.25
OS_AXISANGLE_DEGREES: 057
METHOD_AUTO_MANUAL: AUTO
OS_K2POWER_DIOPTERS: 50.25
OS_K1POWER_DIOPTERS: 50.00
OS_K1POWER_DIOPTERS: 50.00
OD_K2POWER_DIOPTERS: 51.50
OD_AXISANGLE_DEGREES: 133
OS_AXISANGLE_DEGREES: 147
OD_AXISANGLE_DEGREES: 43
OS_AXISANGLE2_DEGREES: 057

## 2023-04-12 ASSESSMENT — VISUAL ACUITY
OD_BCVA: 20/150
OS_BCVA: 20/150

## 2023-04-12 ASSESSMENT — REFRACTION_CURRENTRX
OD_AXIS: 52
OS_SPHERE: +0.75
OD_OVR_VA: 20/
OS_CYLINDER: -1.25
OS_ADD: +2.50
OD_SPHERE: PL
OD_ADD: +2.50
OS_OVR_VA: 20/
OS_AXIS: 117
OD_CYLINDER: -2.00

## 2023-04-12 ASSESSMENT — SPHEQUIV_DERIVED
OS_SPHEQUIV: -0.125
OD_SPHEQUIV: -4.375

## 2023-04-12 ASSESSMENT — CONFRONTATIONAL VISUAL FIELD TEST (CVF)
OD_FINDINGS: FULL
OS_FINDINGS: FULL

## 2023-04-12 ASSESSMENT — REFRACTION_AUTOREFRACTION
OD_SPHERE: -2.75
OS_SPHERE: +0.50
OS_AXIS: 100
OD_CYLINDER: -3.25
OS_CYLINDER: -1.25
OD_AXIS: 074

## 2023-04-12 ASSESSMENT — TONOMETRY: OS_IOP_MMHG: 19

## 2023-05-12 ENCOUNTER — APPOINTMENT (OUTPATIENT)
Dept: ENDOCRINOLOGY | Facility: CLINIC | Age: 68
End: 2023-05-12

## 2023-06-01 ENCOUNTER — ASC (OUTPATIENT)
Dept: URBAN - METROPOLITAN AREA SURGERY 8 | Facility: SURGERY | Age: 68
Setting detail: OPHTHALMOLOGY
End: 2023-06-01
Payer: MEDICARE

## 2023-06-01 DIAGNOSIS — H25.11: ICD-10-CM

## 2023-06-01 PROCEDURE — 66984 XCAPSL CTRC RMVL W/O ECP: CPT | Performed by: OPHTHALMOLOGY

## 2023-06-02 ENCOUNTER — RX ONLY (RX ONLY)
Age: 68
End: 2023-06-02

## 2023-06-02 ENCOUNTER — OFFICE (OUTPATIENT)
Dept: URBAN - METROPOLITAN AREA CLINIC 94 | Facility: CLINIC | Age: 68
Setting detail: OPHTHALMOLOGY
End: 2023-06-02
Payer: MEDICARE

## 2023-06-02 DIAGNOSIS — H25.13: ICD-10-CM

## 2023-06-02 DIAGNOSIS — H25.12: ICD-10-CM

## 2023-06-02 DIAGNOSIS — E11.3292: ICD-10-CM

## 2023-06-02 DIAGNOSIS — H43.811: ICD-10-CM

## 2023-06-02 DIAGNOSIS — E11.3291: ICD-10-CM

## 2023-06-02 DIAGNOSIS — H25.11: ICD-10-CM

## 2023-06-02 PROCEDURE — 99024 POSTOP FOLLOW-UP VISIT: CPT | Performed by: PHYSICIAN ASSISTANT

## 2023-06-02 ASSESSMENT — REFRACTION_CURRENTRX
OS_ADD: +2.50
OS_OVR_VA: 20/
OS_SPHERE: +0.75
OD_ADD: +2.50
OS_AXIS: 117
OD_OVR_VA: 20/
OD_AXIS: 52
OD_SPHERE: PL
OS_CYLINDER: -1.25
OD_CYLINDER: -2.00

## 2023-06-02 ASSESSMENT — KERATOMETRY
METHOD_AUTO_MANUAL: AUTO
OS_AXISANGLE_DEGREES: 009
OD_K1POWER_DIOPTERS: 52.25
OS_K2POWER_DIOPTERS: 50.00
OD_K2POWER_DIOPTERS: 53.25
OD_AXISANGLE_DEGREES: 164
OS_K1POWER_DIOPTERS: 49.75

## 2023-06-02 ASSESSMENT — AXIALLENGTH_DERIVED
OS_AL: 21.42
OD_AL: 22.01

## 2023-06-02 ASSESSMENT — SPHEQUIV_DERIVED
OS_SPHEQUIV: 0.125
OD_SPHEQUIV: -4.375

## 2023-06-02 ASSESSMENT — REFRACTION_AUTOREFRACTION
OS_AXIS: 106
OD_SPHERE: -2.75
OS_CYLINDER: -1.75
OS_SPHERE: +1.00
OD_AXIS: 074
OD_CYLINDER: -3.25

## 2023-06-02 ASSESSMENT — CONFRONTATIONAL VISUAL FIELD TEST (CVF)
OD_FINDINGS: FULL
OS_FINDINGS: FULL

## 2023-06-02 ASSESSMENT — CORNEAL EDEMA CLINICAL DESCRIPTION: OD_CORNEALEDEMA: 2+

## 2023-06-02 ASSESSMENT — TONOMETRY
OD_IOP_MMHG: 17
OS_IOP_MMHG: 18

## 2023-06-02 ASSESSMENT — VISUAL ACUITY
OS_BCVA: 20/150
OD_BCVA: 20/80

## 2023-06-07 ENCOUNTER — OFFICE (OUTPATIENT)
Dept: URBAN - METROPOLITAN AREA CLINIC 94 | Facility: CLINIC | Age: 68
Setting detail: OPHTHALMOLOGY
End: 2023-06-07
Payer: MEDICARE

## 2023-06-07 DIAGNOSIS — Z96.1: ICD-10-CM

## 2023-06-07 DIAGNOSIS — H25.12: ICD-10-CM

## 2023-06-07 PROCEDURE — 92136 OPHTHALMIC BIOMETRY: CPT | Performed by: PHYSICIAN ASSISTANT

## 2023-06-07 PROCEDURE — 99024 POSTOP FOLLOW-UP VISIT: CPT | Performed by: PHYSICIAN ASSISTANT

## 2023-06-07 ASSESSMENT — REFRACTION_AUTOREFRACTION
OD_AXIS: 089
OS_CYLINDER: -1.25
OS_SPHERE: +0.75
OD_SPHERE: +0.75
OD_CYLINDER: -0.75
OS_AXIS: 105

## 2023-06-07 ASSESSMENT — SPHEQUIV_DERIVED
OD_SPHEQUIV: 0.375
OS_SPHEQUIV: 0.125
OD_SPHEQUIV: 0.375

## 2023-06-07 ASSESSMENT — REFRACTION_CURRENTRX
OD_ADD: +2.50
OS_SPHERE: +0.75
OD_SPHERE: PL
OD_AXIS: 55
OS_CYLINDER: -1.25
OS_OVR_VA: 20/
OD_OVR_VA: 20/
OS_ADD: +2.50
OD_CYLINDER: -2.00
OS_AXIS: 107

## 2023-06-07 ASSESSMENT — KERATOMETRY
METHOD_AUTO_MANUAL: AUTO
OS_K1POWER_DIOPTERS: 49.75
OD_K1POWER_DIOPTERS: 50.50
OS_K2POWER_DIOPTERS: 50.25
OD_AXISANGLE_DEGREES: 060
OS_AXISANGLE_DEGREES: 175
OD_K2POWER_DIOPTERS: 50.75

## 2023-06-07 ASSESSMENT — REFRACTION_MANIFEST
OD_VA1: 20/60
OD_SPHERE: +0.75
OD_AXIS: 090
OD_CYLINDER: -0.75

## 2023-06-07 ASSESSMENT — AXIALLENGTH_DERIVED
OD_AL: 21.12
OS_AL: 21.38
OD_AL: 21.12

## 2023-06-07 ASSESSMENT — TONOMETRY: OD_IOP_MMHG: 16

## 2023-06-07 ASSESSMENT — CONFRONTATIONAL VISUAL FIELD TEST (CVF)
OS_FINDINGS: FULL
OD_FINDINGS: FULL

## 2023-06-07 ASSESSMENT — CORNEAL EDEMA CLINICAL DESCRIPTION: OD_CORNEALEDEMA: T

## 2023-06-07 ASSESSMENT — VISUAL ACUITY
OD_BCVA: 20/70-1
OS_BCVA: 20/100+1

## 2023-06-07 ASSESSMENT — CORNEAL EDEMA - FOLDS/STRIAE: OD_FOLDSSTRIAE: T

## 2023-06-13 ENCOUNTER — ASC (OUTPATIENT)
Dept: URBAN - METROPOLITAN AREA SURGERY 8 | Facility: SURGERY | Age: 68
Setting detail: OPHTHALMOLOGY
End: 2023-06-13
Payer: MEDICARE

## 2023-06-13 DIAGNOSIS — H25.12: ICD-10-CM

## 2023-06-13 PROCEDURE — 66984 XCAPSL CTRC RMVL W/O ECP: CPT | Performed by: OPHTHALMOLOGY

## 2023-06-14 ENCOUNTER — RX ONLY (RX ONLY)
Age: 68
End: 2023-06-14

## 2023-06-14 ENCOUNTER — OFFICE (OUTPATIENT)
Dept: URBAN - METROPOLITAN AREA CLINIC 94 | Facility: CLINIC | Age: 68
Setting detail: OPHTHALMOLOGY
End: 2023-06-14
Payer: MEDICARE

## 2023-06-14 DIAGNOSIS — Z96.1: ICD-10-CM

## 2023-06-14 PROCEDURE — 99024 POSTOP FOLLOW-UP VISIT: CPT | Performed by: PHYSICIAN ASSISTANT

## 2023-06-14 ASSESSMENT — REFRACTION_CURRENTRX
OS_CYLINDER: -1.25
OD_OVR_VA: 20/
OD_SPHERE: PL
OS_AXIS: 107
OD_AXIS: 55
OS_ADD: +2.50
OS_SPHERE: +0.75
OD_CYLINDER: -2.00
OS_OVR_VA: 20/
OD_ADD: +2.50

## 2023-06-14 ASSESSMENT — AXIALLENGTH_DERIVED
OD_AL: 21.08
OS_AL: 21.39

## 2023-06-14 ASSESSMENT — REFRACTION_MANIFEST
OD_VA1: 20/60
OD_CYLINDER: -0.75
OD_AXIS: 090
OD_SPHERE: +0.75

## 2023-06-14 ASSESSMENT — KERATOMETRY
OS_K1POWER_DIOPTERS: 49.25
OD_AXISANGLE_DEGREES: 090
OD_K2POWER_DIOPTERS: 50.75
OS_K2POWER_DIOPTERS: 51.25
OD_K1POWER_DIOPTERS: 50.75
METHOD_AUTO_MANUAL: AUTO
OS_AXISANGLE_DEGREES: 031

## 2023-06-14 ASSESSMENT — SPHEQUIV_DERIVED
OD_SPHEQUIV: 0.375
OS_SPHEQUIV: -0.125

## 2023-06-14 ASSESSMENT — CORNEAL EDEMA CLINICAL DESCRIPTION: OS_CORNEALEDEMA: 1+

## 2023-06-14 ASSESSMENT — REFRACTION_AUTOREFRACTION
OS_CYLINDER: -1.25
OD_AXIS: 110
OS_AXIS: 111
OS_SPHERE: +0.50
OD_CYLINDER: -0.50
OD_SPHERE: PLANO

## 2023-06-14 ASSESSMENT — VISUAL ACUITY
OS_BCVA: 20/50
OD_BCVA: 20/80

## 2023-06-14 ASSESSMENT — TONOMETRY
OD_IOP_MMHG: 16
OS_IOP_MMHG: 17

## 2023-06-14 ASSESSMENT — CONFRONTATIONAL VISUAL FIELD TEST (CVF)
OS_FINDINGS: FULL
OD_FINDINGS: FULL

## 2023-06-21 ENCOUNTER — OFFICE (OUTPATIENT)
Dept: URBAN - METROPOLITAN AREA CLINIC 94 | Facility: CLINIC | Age: 68
Setting detail: OPHTHALMOLOGY
End: 2023-06-21
Payer: MEDICARE

## 2023-06-21 DIAGNOSIS — Z96.1: ICD-10-CM

## 2023-06-21 PROCEDURE — 99024 POSTOP FOLLOW-UP VISIT: CPT | Performed by: PHYSICIAN ASSISTANT

## 2023-06-21 ASSESSMENT — AXIALLENGTH_DERIVED
OD_AL: 21.01
OS_AL: 21.34
OD_AL: 21.09

## 2023-06-21 ASSESSMENT — KERATOMETRY
METHOD_AUTO_MANUAL: AUTO
OS_K1POWER_DIOPTERS: 50.00
OS_K2POWER_DIOPTERS: 50.00
OD_K1POWER_DIOPTERS: 50.75
OD_K2POWER_DIOPTERS: 51.25
OS_AXISANGLE_DEGREES: 090
OD_AXISANGLE_DEGREES: 081

## 2023-06-21 ASSESSMENT — VISUAL ACUITY
OS_BCVA: 20/40
OD_BCVA: 20/60+1

## 2023-06-21 ASSESSMENT — REFRACTION_MANIFEST
OD_SPHERE: +0.75
OD_CYLINDER: -0.75
OD_VA1: 20/60
OD_AXIS: 090

## 2023-06-21 ASSESSMENT — REFRACTION_AUTOREFRACTION
OS_AXIS: 092
OS_SPHERE: +0.75
OD_SPHERE: +0.25
OD_CYLINDER: -0.25
OD_AXIS: 100
OS_CYLINDER: -1.00

## 2023-06-21 ASSESSMENT — SPHEQUIV_DERIVED
OD_SPHEQUIV: 0.125
OD_SPHEQUIV: 0.375
OS_SPHEQUIV: 0.25

## 2023-06-21 ASSESSMENT — TONOMETRY
OS_IOP_MMHG: 17
OD_IOP_MMHG: 17

## 2023-06-21 ASSESSMENT — REFRACTION_CURRENTRX
OD_OVR_VA: 20/
OS_ADD: +2.50
OD_CYLINDER: -2.00
OD_AXIS: 55
OS_OVR_VA: 20/
OD_ADD: +2.50
OS_SPHERE: +0.75
OS_AXIS: 107
OD_SPHERE: PL
OS_CYLINDER: -1.25

## 2023-06-21 ASSESSMENT — CONFRONTATIONAL VISUAL FIELD TEST (CVF)
OS_FINDINGS: FULL
OD_FINDINGS: FULL

## 2023-07-05 LAB
ALBUMIN SERPL ELPH-MCNC: 4.4 G/DL
ALP BLD-CCNC: 80 U/L
ALT SERPL-CCNC: 19 U/L
AMYLASE/CREAT SERPL: 96 U/L
ANION GAP SERPL CALC-SCNC: 14 MMOL/L
AST SERPL-CCNC: 23 U/L
BILIRUB SERPL-MCNC: 0.3 MG/DL
BUN SERPL-MCNC: 18 MG/DL
CALCIUM SERPL-MCNC: 8.8 MG/DL
CHLORIDE SERPL-SCNC: 106 MMOL/L
CO2 SERPL-SCNC: 22 MMOL/L
CREAT SERPL-MCNC: 0.76 MG/DL
EGFR: 86 ML/MIN/1.73M2
GLUCOSE SERPL-MCNC: 145 MG/DL
LPL SERPL-CCNC: 30 U/L
POTASSIUM SERPL-SCNC: 4.1 MMOL/L
PROT SERPL-MCNC: 7 G/DL
SODIUM SERPL-SCNC: 142 MMOL/L

## 2023-07-06 ENCOUNTER — APPOINTMENT (OUTPATIENT)
Dept: GASTROENTEROLOGY | Facility: CLINIC | Age: 68
End: 2023-07-06
Payer: MEDICARE

## 2023-07-06 VITALS
RESPIRATION RATE: 16 BRPM | HEIGHT: 55 IN | SYSTOLIC BLOOD PRESSURE: 100 MMHG | HEART RATE: 70 BPM | BODY MASS INDEX: 22.21 KG/M2 | DIASTOLIC BLOOD PRESSURE: 65 MMHG | OXYGEN SATURATION: 98 % | WEIGHT: 96 LBS

## 2023-07-06 PROCEDURE — 99214 OFFICE O/P EST MOD 30 MIN: CPT

## 2023-07-06 RX ORDER — ATORVASTATIN CALCIUM 20 MG/1
20 TABLET, FILM COATED ORAL
Qty: 90 | Refills: 0 | Status: ACTIVE | COMMUNITY
Start: 2022-07-28

## 2023-07-06 RX ORDER — PANTOPRAZOLE 40 MG/1
40 TABLET, DELAYED RELEASE ORAL
Qty: 30 | Refills: 0 | Status: DISCONTINUED | COMMUNITY
Start: 2022-11-04 | End: 2023-07-06

## 2023-07-06 RX ORDER — PANTOPRAZOLE 40 MG/1
40 TABLET, DELAYED RELEASE ORAL
Qty: 90 | Refills: 1 | Status: DISCONTINUED | COMMUNITY
Start: 2022-12-22 | End: 2023-07-06

## 2023-07-06 RX ORDER — BLOOD SUGAR DIAGNOSTIC
STRIP MISCELLANEOUS
Qty: 300 | Refills: 0 | Status: ACTIVE | COMMUNITY
Start: 2022-07-28

## 2023-07-06 NOTE — PHYSICAL EXAM
[Alert] : alert [Normal Voice/Communication] : normal voice/communication [Healthy Appearing] : healthy appearing [No Acute Distress] : no acute distress [Sclera] : the sclera and conjunctiva were normal [Hearing Threshold Finger Rub Not [place]] : hearing was normal [Normal Lips/Gums] : the lips and gums were normal [Oropharynx] : the oropharynx was normal [Normal Appearance] : the appearance of the neck was normal [No Neck Mass] : no neck mass was observed [No Respiratory Distress] : no respiratory distress [No Acc Muscle Use] : no accessory muscle use [Respiration, Rhythm And Depth] : normal respiratory rhythm and effort [Auscultation Breath Sounds / Voice Sounds] : lungs were clear to auscultation bilaterally [Heart Rate And Rhythm] : heart rate was normal and rhythm regular [Normal S1, S2] : normal S1 and S2 [Murmurs] : no murmurs [Abdomen Tenderness] : non-tender [Bowel Sounds] : normal bowel sounds [No Masses] : no abdominal mass palpated [Abdomen Soft] : soft [] : no hepatosplenomegaly [Oriented To Time, Place, And Person] : oriented to person, place, and time

## 2023-07-06 NOTE — REASON FOR VISIT
[Follow-up] : a follow-up of an existing diagnosis [FreeTextEntry1] : Erosive esophagitis.  No Pedersen's esophagus.  Evaluate for screening colonoscopy.

## 2023-07-06 NOTE — ASSESSMENT
[FreeTextEntry1] : Average risk for development of colorectal neoplasia.  Therefore,  a screening colonoscopy was offered to the patient.  The procedure, its risk benefits and prep, were explained to the patient, understands and is agreeable to proceed.  ASA #2.  Mallampati #1.  Patient is in optimal medical condition to undergo planned procedure.  Avalide prep to be utilized.  Repeat blood work requested.  Abdominal sonogram to evaluate liver for fatty changes or cirrhosis.  Arrangements made.  Results to follow.

## 2023-07-21 ENCOUNTER — OFFICE (OUTPATIENT)
Dept: URBAN - METROPOLITAN AREA CLINIC 116 | Facility: CLINIC | Age: 68
Setting detail: OPHTHALMOLOGY
End: 2023-07-21
Payer: MEDICARE

## 2023-07-21 DIAGNOSIS — Z96.1: ICD-10-CM

## 2023-07-21 PROCEDURE — 99024 POSTOP FOLLOW-UP VISIT: CPT | Performed by: OPTOMETRIST

## 2023-07-21 ASSESSMENT — REFRACTION_CURRENTRX
OS_OVR_VA: 20/
OD_OVR_VA: 20/
OD_ADD: +2.50
OD_CYLINDER: -2.00
OS_SPHERE: +0.75
OS_ADD: +2.50
OS_CYLINDER: -1.25
OD_AXIS: 55
OS_AXIS: 107
OD_SPHERE: PL

## 2023-07-21 ASSESSMENT — CONFRONTATIONAL VISUAL FIELD TEST (CVF)
OS_FINDINGS: FULL
OD_FINDINGS: FULL

## 2023-07-21 ASSESSMENT — REFRACTION_MANIFEST
OD_CYLINDER: -0.75
OD_SPHERE: PLANO
OS_CYLINDER: -1.00
OS_ADD: +2.50
OD_AXIS: 040
OS_AXIS: 145
OD_SPHERE: +0.75
OU_VA: 20/25-
OD_AXIS: 090
OD_VA1: 20/30
OS_SPHERE: PLANO
OS_VA1: 20/30
OD_ADD: +2.50
OD_VA1: 20/60
OD_CYLINDER: -0.50

## 2023-07-21 ASSESSMENT — REFRACTION_AUTOREFRACTION
OD_SPHERE: +-0.25
OS_SPHERE: +0.25
OD_AXIS: 040
OD_CYLINDER: -0.75
OS_CYLINDER: -1.25
OS_AXIS: 145

## 2023-07-21 ASSESSMENT — SPHEQUIV_DERIVED
OS_SPHEQUIV: -0.375
OD_SPHEQUIV: 0.375

## 2023-07-21 ASSESSMENT — KERATOMETRY
OD_AXISANGLE_DEGREES: 100
OS_AXISANGLE_DEGREES: 080
OD_K2POWER_DIOPTERS: 52.00
OD_K1POWER_DIOPTERS: 51.25
OS_K1POWER_DIOPTERS: 50.25
OS_K2POWER_DIOPTERS: 52.00
METHOD_AUTO_MANUAL: AUTO

## 2023-07-21 ASSESSMENT — TONOMETRY
OD_IOP_MMHG: 16
OS_IOP_MMHG: 16

## 2023-07-21 ASSESSMENT — VISUAL ACUITY
OD_BCVA: 20/40
OS_BCVA: 20/40

## 2023-07-21 ASSESSMENT — AXIALLENGTH_DERIVED
OD_AL: 20.83
OS_AL: 21.21

## 2023-08-02 ENCOUNTER — APPOINTMENT (OUTPATIENT)
Dept: ULTRASOUND IMAGING | Facility: CLINIC | Age: 68
End: 2023-08-02
Payer: MEDICARE

## 2023-08-02 ENCOUNTER — OUTPATIENT (OUTPATIENT)
Dept: OUTPATIENT SERVICES | Facility: HOSPITAL | Age: 68
LOS: 1 days | End: 2023-08-02
Payer: MEDICARE

## 2023-08-02 DIAGNOSIS — Z12.11 ENCOUNTER FOR SCREENING FOR MALIGNANT NEOPLASM OF COLON: ICD-10-CM

## 2023-08-02 DIAGNOSIS — Z98.89 OTHER SPECIFIED POSTPROCEDURAL STATES: Chronic | ICD-10-CM

## 2023-08-02 PROCEDURE — 76700 US EXAM ABDOM COMPLETE: CPT | Mod: 26

## 2023-08-02 PROCEDURE — 76700 US EXAM ABDOM COMPLETE: CPT

## 2023-08-07 ENCOUNTER — RX RENEWAL (OUTPATIENT)
Age: 68
End: 2023-08-07

## 2023-08-28 PROBLEM — Z12.11 COLON CANCER SCREENING: Status: ACTIVE | Noted: 2023-07-06

## 2023-08-28 PROBLEM — R10.84 ABDOMINAL PAIN, DIFFUSE: Status: ACTIVE | Noted: 2022-11-23

## 2023-08-28 PROBLEM — Z86.79 HISTORY OF ESSENTIAL HYPERTENSION: Status: RESOLVED | Noted: 2023-03-15 | Resolved: 2023-08-28

## 2023-08-28 PROBLEM — Z86.39 HISTORY OF HYPERLIPIDEMIA: Status: RESOLVED | Noted: 2023-03-15 | Resolved: 2023-08-28

## 2023-08-28 NOTE — PHYSICAL EXAM

## 2023-08-30 ENCOUNTER — TRANSCRIPTION ENCOUNTER (OUTPATIENT)
Age: 68
End: 2023-08-30

## 2023-08-31 ENCOUNTER — APPOINTMENT (OUTPATIENT)
Dept: GASTROENTEROLOGY | Facility: GI CENTER | Age: 68
End: 2023-08-31
Payer: MEDICARE

## 2023-08-31 ENCOUNTER — RESULT REVIEW (OUTPATIENT)
Age: 68
End: 2023-08-31

## 2023-08-31 ENCOUNTER — OUTPATIENT (OUTPATIENT)
Dept: OUTPATIENT SERVICES | Facility: HOSPITAL | Age: 68
LOS: 1 days | End: 2023-08-31
Payer: MEDICARE

## 2023-08-31 DIAGNOSIS — Z12.11 ENCOUNTER FOR SCREENING FOR MALIGNANT NEOPLASM OF COLON: ICD-10-CM

## 2023-08-31 DIAGNOSIS — R10.84 GENERALIZED ABDOMINAL PAIN: ICD-10-CM

## 2023-08-31 DIAGNOSIS — Z86.39 PERSONAL HISTORY OF OTHER ENDOCRINE, NUTRITIONAL AND METABOLIC DISEASE: ICD-10-CM

## 2023-08-31 DIAGNOSIS — Z98.89 OTHER SPECIFIED POSTPROCEDURAL STATES: Chronic | ICD-10-CM

## 2023-08-31 DIAGNOSIS — Z86.79 PERSONAL HISTORY OF OTHER DISEASES OF THE CIRCULATORY SYSTEM: ICD-10-CM

## 2023-08-31 LAB — GLUCOSE BLDC GLUCOMTR-MCNC: 100 MG/DL — HIGH (ref 70–99)

## 2023-08-31 PROCEDURE — 82962 GLUCOSE BLOOD TEST: CPT

## 2023-08-31 PROCEDURE — 88305 TISSUE EXAM BY PATHOLOGIST: CPT

## 2023-08-31 PROCEDURE — 45385 COLONOSCOPY W/LESION REMOVAL: CPT | Mod: PT

## 2023-08-31 PROCEDURE — 88305 TISSUE EXAM BY PATHOLOGIST: CPT | Mod: 26

## 2023-08-31 NOTE — REASON FOR VISIT
[Follow-up] : a follow-up of an existing diagnosis [FreeTextEntry1] : Preop for screening colonoscopy.

## 2023-09-07 ENCOUNTER — RX RENEWAL (OUTPATIENT)
Age: 68
End: 2023-09-07

## 2023-09-07 LAB — SURGICAL PATHOLOGY STUDY: SIGNIFICANT CHANGE UP

## 2023-09-07 RX ORDER — PANTOPRAZOLE 20 MG/1
20 TABLET, DELAYED RELEASE ORAL
Qty: 90 | Refills: 1 | Status: ACTIVE | COMMUNITY
Start: 2023-03-14 | End: 1900-01-01

## 2023-10-13 ENCOUNTER — APPOINTMENT (OUTPATIENT)
Dept: ENDOCRINOLOGY | Facility: CLINIC | Age: 68
End: 2023-10-13
Payer: MEDICARE

## 2023-10-13 VITALS
HEIGHT: 55 IN | BODY MASS INDEX: 22.91 KG/M2 | SYSTOLIC BLOOD PRESSURE: 100 MMHG | DIASTOLIC BLOOD PRESSURE: 60 MMHG | HEART RATE: 85 BPM | OXYGEN SATURATION: 96 % | WEIGHT: 99 LBS

## 2023-10-13 LAB — GLUCOSE BLDC GLUCOMTR-MCNC: 94

## 2023-10-13 PROCEDURE — 99214 OFFICE O/P EST MOD 30 MIN: CPT | Mod: 25

## 2023-10-13 PROCEDURE — 82962 GLUCOSE BLOOD TEST: CPT

## 2023-11-08 ENCOUNTER — RX RENEWAL (OUTPATIENT)
Age: 68
End: 2023-11-08

## 2023-12-01 ENCOUNTER — ASC (OUTPATIENT)
Dept: URBAN - METROPOLITAN AREA SURGERY 8 | Facility: SURGERY | Age: 68
Setting detail: OPHTHALMOLOGY
End: 2023-12-01
Payer: MEDICARE

## 2023-12-01 ENCOUNTER — OFFICE (OUTPATIENT)
Dept: URBAN - METROPOLITAN AREA CLINIC 94 | Facility: CLINIC | Age: 68
Setting detail: OPHTHALMOLOGY
End: 2023-12-01
Payer: MEDICARE

## 2023-12-01 DIAGNOSIS — E11.3293: ICD-10-CM

## 2023-12-01 DIAGNOSIS — H26.493: ICD-10-CM

## 2023-12-01 DIAGNOSIS — H26.492: ICD-10-CM

## 2023-12-01 PROCEDURE — 66821 AFTER CATARACT LASER SURGERY: CPT | Mod: LT | Performed by: OPHTHALMOLOGY

## 2023-12-01 PROCEDURE — 92134 CPTRZ OPH DX IMG PST SGM RTA: CPT | Performed by: OPHTHALMOLOGY

## 2023-12-01 PROCEDURE — 99213 OFFICE O/P EST LOW 20 MIN: CPT | Mod: 57 | Performed by: OPHTHALMOLOGY

## 2023-12-01 ASSESSMENT — REFRACTION_AUTOREFRACTION
OD_CYLINDER: -0.75
OS_AXIS: 096
OS_SPHERE: +1.75
OD_SPHERE: +0.25
OD_AXIS: 096
OS_CYLINDER: -1.25

## 2023-12-01 ASSESSMENT — SPHEQUIV_DERIVED
OD_SPHEQUIV: -0.125
OS_SPHEQUIV: 1.125
OD_SPHEQUIV: 0.375

## 2023-12-01 ASSESSMENT — REFRACTION_MANIFEST
OD_SPHERE: +0.75
OS_CYLINDER: -1.00
OD_CYLINDER: -0.75
OD_AXIS: 090
OS_ADD: +2.50
OS_SPHERE: PLANO
OD_CYLINDER: -0.50
OD_SPHERE: PLANO
OU_VA: 20/25-
OD_ADD: +2.50
OD_AXIS: 040
OD_VA1: 20/30
OS_VA1: 20/30
OS_AXIS: 145
OD_VA1: 20/60

## 2023-12-01 ASSESSMENT — REFRACTION_CURRENTRX
OD_OVR_VA: 20/
OS_OVR_VA: 20/
OD_ADD: +2.50
OS_SPHERE: +0.75
OS_ADD: +2.50
OD_CYLINDER: -2.00
OS_CYLINDER: -1.25
OD_AXIS: 55
OD_SPHERE: PL
OS_AXIS: 107

## 2023-12-01 ASSESSMENT — CONFRONTATIONAL VISUAL FIELD TEST (CVF)
OS_FINDINGS: FULL
OD_FINDINGS: FULL

## 2023-12-05 ENCOUNTER — APPOINTMENT (OUTPATIENT)
Dept: GASTROENTEROLOGY | Facility: CLINIC | Age: 68
End: 2023-12-05
Payer: MEDICARE

## 2023-12-05 VITALS
DIASTOLIC BLOOD PRESSURE: 85 MMHG | BODY MASS INDEX: 23.61 KG/M2 | TEMPERATURE: 96.5 F | HEART RATE: 77 BPM | WEIGHT: 102 LBS | HEIGHT: 55 IN | SYSTOLIC BLOOD PRESSURE: 150 MMHG

## 2023-12-05 DIAGNOSIS — K21.9 GASTRO-ESOPHAGEAL REFLUX DISEASE W/OUT ESOPHAGITIS: ICD-10-CM

## 2023-12-05 DIAGNOSIS — D12.5 BENIGN NEOPLASM OF SIGMOID COLON: ICD-10-CM

## 2023-12-05 DIAGNOSIS — Z87.19 PERSONAL HISTORY OF OTHER DISEASES OF THE DIGESTIVE SYSTEM: ICD-10-CM

## 2023-12-05 PROCEDURE — 99214 OFFICE O/P EST MOD 30 MIN: CPT

## 2023-12-05 RX ORDER — POLYETHYLENE GLYCOL-3350 AND ELECTROLYTES WITH FLAVOR PACK 240; 5.84; 2.98; 6.72; 22.72 G/278.26G; G/278.26G; G/278.26G; G/278.26G; G/278.26G
240 POWDER, FOR SOLUTION ORAL
Qty: 4000 | Refills: 0 | Status: DISCONTINUED | COMMUNITY
Start: 2023-07-06 | End: 2023-12-05

## 2023-12-05 RX ORDER — PANTOPRAZOLE 20 MG/1
20 TABLET, DELAYED RELEASE ORAL
Qty: 90 | Refills: 3 | Status: ACTIVE | COMMUNITY
Start: 2023-12-05 | End: 1900-01-01

## 2023-12-08 ENCOUNTER — ASC (OUTPATIENT)
Dept: URBAN - METROPOLITAN AREA SURGERY 8 | Facility: SURGERY | Age: 68
Setting detail: OPHTHALMOLOGY
End: 2023-12-08
Payer: MEDICARE

## 2023-12-08 ENCOUNTER — RX ONLY (RX ONLY)
Age: 68
End: 2023-12-08

## 2023-12-08 DIAGNOSIS — H26.491: ICD-10-CM

## 2023-12-08 PROBLEM — H26.492 POSTERIOR CAPSULAR OPACIFICATION; RIGHT EYE, LEFT EYE, BOTH EYES: Status: ACTIVE | Noted: 2023-12-01

## 2023-12-08 PROBLEM — H26.493 POSTERIOR CAPSULAR OPACIFICATION; RIGHT EYE, LEFT EYE, BOTH EYES: Status: ACTIVE | Noted: 2023-12-01

## 2023-12-08 PROCEDURE — 66821 AFTER CATARACT LASER SURGERY: CPT | Mod: 79,RT | Performed by: OPHTHALMOLOGY

## 2023-12-08 ASSESSMENT — REFRACTION_MANIFEST
OS_ADD: +2.50
OD_AXIS: 090
OU_VA: 20/25-
OD_VA1: 20/60
OS_SPHERE: PLANO
OD_ADD: +2.50
OD_CYLINDER: -0.75
OD_SPHERE: +0.75
OD_VA1: 20/30
OD_CYLINDER: -0.50
OD_SPHERE: PLANO
OS_AXIS: 145
OS_CYLINDER: -1.00
OD_AXIS: 040
OS_VA1: 20/30

## 2023-12-08 ASSESSMENT — CONFRONTATIONAL VISUAL FIELD TEST (CVF)
OS_FINDINGS: FULL
OD_FINDINGS: FULL

## 2023-12-08 ASSESSMENT — REFRACTION_CURRENTRX
OS_CYLINDER: -1.25
OS_ADD: +2.50
OD_AXIS: 55
OD_CYLINDER: -2.00
OS_AXIS: 107
OD_SPHERE: PL
OS_OVR_VA: 20/
OS_SPHERE: +0.75
OD_ADD: +2.50
OD_OVR_VA: 20/

## 2023-12-08 ASSESSMENT — REFRACTION_AUTOREFRACTION
OS_AXIS: 096
OS_SPHERE: +1.75
OS_CYLINDER: -1.25
OD_SPHERE: +0.25
OD_AXIS: 096
OD_CYLINDER: -0.75

## 2023-12-08 ASSESSMENT — SPHEQUIV_DERIVED
OD_SPHEQUIV: -0.125
OS_SPHEQUIV: 1.125
OD_SPHEQUIV: 0.375

## 2023-12-11 ENCOUNTER — RX RENEWAL (OUTPATIENT)
Age: 68
End: 2023-12-11

## 2024-01-05 ENCOUNTER — OFFICE (OUTPATIENT)
Dept: URBAN - METROPOLITAN AREA CLINIC 116 | Facility: CLINIC | Age: 69
Setting detail: OPHTHALMOLOGY
End: 2024-01-05
Payer: MEDICARE

## 2024-01-05 DIAGNOSIS — H26.493: ICD-10-CM

## 2024-01-05 PROCEDURE — 99024 POSTOP FOLLOW-UP VISIT: CPT | Performed by: OPTOMETRIST

## 2024-01-05 RX ORDER — BLOOD SUGAR DIAGNOSTIC
STRIP MISCELLANEOUS
Qty: 1 | Refills: 2 | Status: ACTIVE | COMMUNITY
Start: 2024-01-05 | End: 1900-01-01

## 2024-01-05 RX ORDER — LANCETS 30 GAUGE
EACH MISCELLANEOUS
Qty: 1 | Refills: 2 | Status: ACTIVE | COMMUNITY
Start: 2024-01-05 | End: 1900-01-01

## 2024-01-05 ASSESSMENT — CONFRONTATIONAL VISUAL FIELD TEST (CVF)
OD_FINDINGS: FULL
OS_FINDINGS: FULL

## 2024-01-05 ASSESSMENT — REFRACTION_CURRENTRX
OD_ADD: +2.50
OS_CYLINDER: -1.00
OD_OVR_VA: 20/
OS_AXIS: 140
OS_SPHERE: PLANO
OD_SPHERE: PL
OS_AXIS: 107
OD_SPHERE: PLANO
OD_CYLINDER: -0.50
OS_SPHERE: +0.75
OS_ADD: +2.50
OS_OVR_VA: 20/
OD_AXIS: 55
OD_CYLINDER: -2.00
OS_CYLINDER: -1.25
OD_AXIS: 040
OS_OVR_VA: 20/
OD_OVR_VA: 20/

## 2024-01-05 ASSESSMENT — REFRACTION_MANIFEST
OS_VA1: 20/30
OD_ADD: +2.50
OU_VA: 20/25-
OD_SPHERE: PLANO
OS_CYLINDER: -1.00
OS_SPHERE: PLANO
OD_CYLINDER: -0.75
OS_AXIS: 145
OD_AXIS: 090
OD_VA1: 20/60
OD_SPHERE: +0.75
OD_AXIS: 040
OD_VA1: 20/30
OD_CYLINDER: -0.50
OS_ADD: +2.50

## 2024-01-05 ASSESSMENT — REFRACTION_AUTOREFRACTION
OS_AXIS: 105
OS_SPHERE: +1.50
OS_CYLINDER: -1.00
OD_AXIS: 095
OD_CYLINDER: -1.50
OD_SPHERE: +0.75

## 2024-01-05 ASSESSMENT — SPHEQUIV_DERIVED
OD_SPHEQUIV: 0
OD_SPHEQUIV: 0.375
OS_SPHEQUIV: 1

## 2024-02-23 ENCOUNTER — APPOINTMENT (OUTPATIENT)
Dept: ENDOCRINOLOGY | Facility: CLINIC | Age: 69
End: 2024-02-23
Payer: MEDICARE

## 2024-02-23 VITALS
HEIGHT: 55 IN | SYSTOLIC BLOOD PRESSURE: 90 MMHG | WEIGHT: 106 LBS | HEART RATE: 85 BPM | OXYGEN SATURATION: 96 % | BODY MASS INDEX: 24.53 KG/M2 | DIASTOLIC BLOOD PRESSURE: 62 MMHG

## 2024-02-23 LAB — GLUCOSE BLDC GLUCOMTR-MCNC: 249

## 2024-02-23 PROCEDURE — 99214 OFFICE O/P EST MOD 30 MIN: CPT

## 2024-02-23 PROCEDURE — 82962 GLUCOSE BLOOD TEST: CPT

## 2024-02-23 RX ORDER — REPAGLINIDE 0.5 MG/1
0.5 TABLET ORAL 3 TIMES DAILY
Qty: 270 | Refills: 1 | Status: ACTIVE | COMMUNITY
Start: 2022-11-08 | End: 1900-01-01

## 2024-02-23 NOTE — HISTORY OF PRESENT ILLNESS
[FreeTextEntry1] : Here with brother- Micaela - he is primary caretaker, lives with pt  T2DM Previously controlled by PCP Severity: well controlled  Onset: approx 10 years ago  Modifying Factors: did not leave the hospital on insulin  SMBG Has not been checking sugars reguarly  FS in office 249- had eggs, bread, coffee   Last HGA1C 7.7-2/2024  Current drug regimen Januvia 100 mg daily  Prandin 0.5 mg with dinner   Cannot ever use SLGT2 again- caused euglycemic DKA  Previously was on glipizide and it caused hypoglycemia  MFN- diarrhea  Eye exam: Summer 2023- denies DR, s/p cataract surgery bilaterally Foot exam: Goes to podiatrist  Kidney disease: denies  Heart disease: denies   Weight: slowly gaining weight  Diet- dinner is largest meal of the day B- oatmeal and coffee L- skips lunch D- chicken, salad, potato salad  Drinks water  Exercise: does not exercise  Smoking: denies   HTN BP in office 90/62 Lisinopril 2.5 mg daily   HLD On statin- atorvastatin 20 mg daily  Need updated lipid panel  Vit D Def On no medication Low on last labs  Low PLTS on labs- not new for pt

## 2024-02-23 NOTE — ASSESSMENT
[FreeTextEntry1] : T2DM -Pt presents to office after a hospital visit for euglycemic DKA when on Jardiance.  -HGA1C has room for improvement although she is not a candidate for tight control  -Continue on Januvia 100 mg daily -Increase Prandin 0.5 mg to  be given with all meals (sometimes she only eats brunch and dinner)  -Restart to check blood sugars at least 2 x a day and bring logs to next visit. Should now be checking mid day as well. -Pt and brother not interested in CDE at this time -Pt will not exercise -Continue to follow up with all specialists including ophtho    HTN -BP stable in office, continue regimen    HLD  -Need updated fasting lipid panel, continue statin  Vit D Def -Begin daily 1000 IU vit d supplement   Fasting labs due 3 months  RTO 3 months MD.

## 2024-02-23 NOTE — PHYSICAL EXAM
[Alert] : alert [Well Nourished] : well nourished [Normal Sclera/Conjunctiva] : normal sclera/conjunctiva [Normal Hearing] : hearing was normal [Thyroid Not Enlarged] : the thyroid was not enlarged [No Accessory Muscle Use] : no accessory muscle use [Clear to Auscultation] : lungs were clear to auscultation bilaterally [Normal S1, S2] : normal S1 and S2 [Normal Rate] : heart rate was normal [No Edema] : no peripheral edema [Not Tender] : non-tender [Soft] : abdomen soft [Normal Gait] : normal gait [No Rash] : no rash [No Tremors] : no tremors [No Acute Distress] : no acute distress [de-identified] : +developmentally delayed

## 2024-03-08 ENCOUNTER — RX RENEWAL (OUTPATIENT)
Age: 69
End: 2024-03-08

## 2024-05-21 NOTE — ED PROVIDER NOTE - HEME/LYMPH NEGATIVE STATEMENT, MLM
HUB TO RELAY. CALLED AND SPOKE TO PATIENT HE WILL KEEP HIS FOLLOW UP IN JUNE.   no anemia, no easy bruising, no jaundice, no swollen lymph nodes.

## 2024-06-12 ENCOUNTER — NON-APPOINTMENT (OUTPATIENT)
Age: 69
End: 2024-06-12

## 2024-06-12 LAB
25(OH)D3 SERPL-MCNC: 36.8 NG/ML
ALBUMIN SERPL ELPH-MCNC: 4.5 G/DL
ALP BLD-CCNC: 74 U/L
ALT SERPL-CCNC: 13 U/L
ANION GAP SERPL CALC-SCNC: 16 MMOL/L
AST SERPL-CCNC: 15 U/L
BASOPHILS # BLD AUTO: 0.06 K/UL
BASOPHILS NFR BLD AUTO: 0.6 %
BILIRUB SERPL-MCNC: 0.3 MG/DL
BUN SERPL-MCNC: 16 MG/DL
CALCIUM SERPL-MCNC: 9.5 MG/DL
CHLORIDE SERPL-SCNC: 105 MMOL/L
CHOLEST SERPL-MCNC: 142 MG/DL
CO2 SERPL-SCNC: 23 MMOL/L
CREAT SERPL-MCNC: 0.84 MG/DL
EGFR: 76 ML/MIN/1.73M2
EOSINOPHIL # BLD AUTO: 0.07 K/UL
EOSINOPHIL NFR BLD AUTO: 0.8 %
GLUCOSE SERPL-MCNC: 175 MG/DL
HCT VFR BLD CALC: 39.8 %
HDLC SERPL-MCNC: 65 MG/DL
HGB BLD-MCNC: 13 G/DL
IMM GRANULOCYTES NFR BLD AUTO: 0.2 %
LDLC SERPL CALC-MCNC: 62 MG/DL
LYMPHOCYTES # BLD AUTO: 1.24 K/UL
LYMPHOCYTES NFR BLD AUTO: 13.3 %
MAN DIFF?: NORMAL
MCHC RBC-ENTMCNC: 29.4 PG
MCHC RBC-ENTMCNC: 32.7 GM/DL
MCV RBC AUTO: 90 FL
MONOCYTES # BLD AUTO: 0.45 K/UL
MONOCYTES NFR BLD AUTO: 4.8 %
NEUTROPHILS # BLD AUTO: 7.45 K/UL
NEUTROPHILS NFR BLD AUTO: 80.3 %
NONHDLC SERPL-MCNC: 77 MG/DL
PLATELET # BLD AUTO: 113 K/UL
POTASSIUM SERPL-SCNC: 4.9 MMOL/L
PROT SERPL-MCNC: 7.3 G/DL
RBC # BLD: 4.42 M/UL
RBC # FLD: 14.1 %
SODIUM SERPL-SCNC: 144 MMOL/L
T3FREE SERPL-MCNC: 3.41 PG/ML
T4 FREE SERPL-MCNC: 1.5 NG/DL
TRIGL SERPL-MCNC: 77 MG/DL
TSH SERPL-ACNC: 3.16 UIU/ML
VIT B12 SERPL-MCNC: 729 PG/ML
WBC # FLD AUTO: 9.29 K/UL

## 2024-06-13 ENCOUNTER — NON-APPOINTMENT (OUTPATIENT)
Age: 69
End: 2024-06-13

## 2024-06-13 LAB
CREAT SPEC-SCNC: 158 MG/DL
ESTIMATED AVERAGE GLUCOSE: 194 MG/DL
HBA1C MFR BLD HPLC: 8.4 %
MICROALBUMIN 24H UR DL<=1MG/L-MCNC: 7.6 MG/DL
MICROALBUMIN/CREAT 24H UR-RTO: 48 MG/G

## 2024-06-20 ENCOUNTER — APPOINTMENT (OUTPATIENT)
Dept: ENDOCRINOLOGY | Facility: CLINIC | Age: 69
End: 2024-06-20
Payer: MEDICARE

## 2024-06-20 ENCOUNTER — RESULT CHARGE (OUTPATIENT)
Age: 69
End: 2024-06-20

## 2024-06-20 VITALS
SYSTOLIC BLOOD PRESSURE: 90 MMHG | DIASTOLIC BLOOD PRESSURE: 60 MMHG | HEART RATE: 80 BPM | BODY MASS INDEX: 24.76 KG/M2 | HEIGHT: 55 IN | WEIGHT: 107 LBS | OXYGEN SATURATION: 97 %

## 2024-06-20 DIAGNOSIS — R79.89 OTHER SPECIFIED ABNORMAL FINDINGS OF BLOOD CHEMISTRY: ICD-10-CM

## 2024-06-20 DIAGNOSIS — E78.5 HYPERLIPIDEMIA, UNSPECIFIED: ICD-10-CM

## 2024-06-20 DIAGNOSIS — I10 ESSENTIAL (PRIMARY) HYPERTENSION: ICD-10-CM

## 2024-06-20 DIAGNOSIS — E11.9 TYPE 2 DIABETES MELLITUS W/OUT COMPLICATIONS: ICD-10-CM

## 2024-06-20 LAB — GLUCOSE BLDC GLUCOMTR-MCNC: 107

## 2024-06-20 PROCEDURE — 82962 GLUCOSE BLOOD TEST: CPT

## 2024-06-20 PROCEDURE — 99215 OFFICE O/P EST HI 40 MIN: CPT

## 2024-06-20 RX ORDER — MULTIVIT-MIN/FOLIC/VIT K/LYCOP 400-300MCG
50 MCG TABLET ORAL
Qty: 90 | Refills: 1 | Status: ACTIVE | COMMUNITY
Start: 2024-06-20 | End: 1900-01-01

## 2024-06-20 NOTE — HISTORY OF PRESENT ILLNESS
[FreeTextEntry1] : Angy is a 68 yr old female, here for DM type 2, was seen by NP, seeing me first time. Pt is significantly nonverbal. Here with brother- Micaela - he is primary caretaker, lives with pt  T2DM Previously controlled by PCP Severity: well controlled  Onset: approx 10 years ago  Modifying Factors: did not leave the hospital on insulin   FS in office 237, she just ate 45 min ago A1C 7.7%-2/24, 8.4% in 6/24.  Current drug regimen Januvia 100 mg daily  Prandin 0.5 mg with dinner   Cannot ever use SLGT2 again- caused euglycemic DKA  Previously was on glipizide and it caused hypoglycemia  MFN- diarrhea  SMBG Has not been checking sugars regularly  But says when he checks in morning its in 200s/  Weight: slowly gaining weight  Diet- dinner is largest meal of the day B- oatmeal and coffee L- skips lunch D- chicken, salad, potato salad  Drinks water  Exercise: does not exercise  Smoking: denies    Eye exam: Summer 2023- denies DR, s/p cataract surgery bilaterally, coming up. Foot exam: Goes to podiatrist  Kidney disease: denies  Heart disease: denies

## 2024-06-20 NOTE — PHYSICAL EXAM
[Alert] : alert [Well Nourished] : well nourished [No Acute Distress] : no acute distress [Normal Sclera/Conjunctiva] : normal sclera/conjunctiva [Thyroid Not Enlarged] : the thyroid was not enlarged [No Respiratory Distress] : no respiratory distress [No Accessory Muscle Use] : no accessory muscle use [Clear to Auscultation] : lungs were clear to auscultation bilaterally [Normal S1, S2] : normal S1 and S2 [Normal Rate] : heart rate was normal [No Edema] : no peripheral edema [Normal Gait] : normal gait [No Tremors] : no tremors [de-identified] : +developmentally delayed

## 2024-06-20 NOTE — ASSESSMENT
[FreeTextEntry1] : Angy is a 68 yr old female, here for DM type 2, was seen by NP, seeing me first time.  Here with brother- Micaela - he is primary caretaker, lives with pt  T2DM Previously controlled by PCP Severity: well controlled  Onset: approx 10 years ago  Modifying Factors: did not leave the hospital on insulin   FS in office 237, she just ate 45 min ago A1C 7.7%-2/24, 8.4% in 6/24.  Current drug regimen Januvia 100 mg daily  Prandin 0.5 mg with dinner    Brother takes care of her, incase she needs insulin, he has to do it for her, he says he will first try to work on her diet more as its not easy for him to do insulin and check sugars all the time.   Medication changes: For now continue same meds, incase next visit a1c  still above 8% will start insulin.  To check sugars once  a day  at different times. Diet and exercise reviewed. Hypoglycemia reviewed.  Eyes: no  active issues, to check for   retinopathy Feet: no active issues, no neuropathy.  Diabetic foot care reviewed.  Lipids:  on statin/ anti-lipid treatment. Last LDL: 62.  Renal/ B/P : B/P wnl , on ACE/ ARB. has some proteinuria. will recheck next visit.  increased dose of lisinopril. Weight:   Overweight . Balanced diet and exercise reviewed.   Diabetes counselling:  The patient was counseled on diabetes foot care, long term vascular complications of diabetes, carbohydrate consistent diet, importance of diet and exercise to improve glycemic control, achieve weight loss and improve cardiovascular health and action and use of short and long-acting insulin. Patient was referred to ophthalmology for retinopathy screening. ADA diet (30-50 gm carbohydrates with each meal, 15 grams with snacks. Balance with lean proteins and low fat diet.) Exercise daily per ability, work up to 30 minutes a day. Medication, risks and benefits reviewed. Glucose testing and insulin administration for glycemic management.  RTO 3 months

## 2024-06-26 ENCOUNTER — OFFICE (OUTPATIENT)
Dept: URBAN - METROPOLITAN AREA CLINIC 94 | Facility: CLINIC | Age: 69
Setting detail: OPHTHALMOLOGY
End: 2024-06-26
Payer: MEDICARE

## 2024-06-26 DIAGNOSIS — E11.3292: ICD-10-CM

## 2024-06-26 DIAGNOSIS — H43.811: ICD-10-CM

## 2024-06-26 DIAGNOSIS — E11.3291: ICD-10-CM

## 2024-06-26 PROCEDURE — 92014 COMPRE OPH EXAM EST PT 1/>: CPT | Performed by: OPHTHALMOLOGY

## 2024-06-26 PROCEDURE — 92134 CPTRZ OPH DX IMG PST SGM RTA: CPT | Performed by: OPHTHALMOLOGY

## 2024-06-26 ASSESSMENT — CONFRONTATIONAL VISUAL FIELD TEST (CVF)
OD_FINDINGS: FULL
OS_FINDINGS: FULL

## 2024-06-28 ENCOUNTER — RX RENEWAL (OUTPATIENT)
Age: 69
End: 2024-06-28

## 2024-07-22 ENCOUNTER — APPOINTMENT (OUTPATIENT)
Dept: MAMMOGRAPHY | Facility: CLINIC | Age: 69
End: 2024-07-22
Payer: MEDICARE

## 2024-07-22 PROCEDURE — 77063 BREAST TOMOSYNTHESIS BI: CPT | Mod: 26

## 2024-07-22 PROCEDURE — 77067 SCR MAMMO BI INCL CAD: CPT | Mod: 26

## 2024-09-18 ENCOUNTER — LABORATORY RESULT (OUTPATIENT)
Age: 69
End: 2024-09-18

## 2024-09-25 ENCOUNTER — APPOINTMENT (OUTPATIENT)
Dept: ENDOCRINOLOGY | Facility: CLINIC | Age: 69
End: 2024-09-25

## 2024-09-30 ENCOUNTER — RX RENEWAL (OUTPATIENT)
Age: 69
End: 2024-09-30

## 2024-12-03 ENCOUNTER — APPOINTMENT (OUTPATIENT)
Dept: GASTROENTEROLOGY | Facility: CLINIC | Age: 69
End: 2024-12-03

## 2024-12-17 ENCOUNTER — OFFICE (OUTPATIENT)
Dept: URBAN - METROPOLITAN AREA CLINIC 94 | Facility: CLINIC | Age: 69
Setting detail: OPHTHALMOLOGY
End: 2024-12-17
Payer: MEDICARE

## 2024-12-17 DIAGNOSIS — H43.811: ICD-10-CM

## 2024-12-17 DIAGNOSIS — E11.3293: ICD-10-CM

## 2024-12-17 PROCEDURE — 92134 CPTRZ OPH DX IMG PST SGM RTA: CPT | Performed by: OPHTHALMOLOGY

## 2024-12-17 PROCEDURE — 92012 INTRM OPH EXAM EST PATIENT: CPT | Performed by: OPHTHALMOLOGY

## 2024-12-17 ASSESSMENT — KERATOMETRY
OD_K1POWER_DIOPTERS: 51.25
METHOD_AUTO_MANUAL: AUTO
OS_K2POWER_DIOPTERS: 50.00
OD_K2POWER_DIOPTERS: 51.75
OS_AXISANGLE_DEGREES: 135
OD_AXISANGLE_DEGREES: 055
OS_K1POWER_DIOPTERS: 49.75

## 2024-12-17 ASSESSMENT — REFRACTION_AUTOREFRACTION
OD_SPHERE: +0.75
OS_AXIS: 105
OD_AXIS: 095
OS_SPHERE: +1.50
OS_CYLINDER: -1.00
OD_CYLINDER: -1.50

## 2024-12-17 ASSESSMENT — TONOMETRY: OD_IOP_MMHG: 18

## 2024-12-17 ASSESSMENT — VISUAL ACUITY
OD_BCVA: 20/60
OS_BCVA: 20/40

## 2025-01-10 ENCOUNTER — APPOINTMENT (OUTPATIENT)
Dept: ENDOCRINOLOGY | Facility: CLINIC | Age: 70
End: 2025-01-10
Payer: MEDICARE

## 2025-01-10 VITALS
DIASTOLIC BLOOD PRESSURE: 72 MMHG | OXYGEN SATURATION: 99 % | HEART RATE: 100 BPM | WEIGHT: 106 LBS | SYSTOLIC BLOOD PRESSURE: 118 MMHG | HEIGHT: 55 IN | BODY MASS INDEX: 24.53 KG/M2

## 2025-01-10 DIAGNOSIS — E11.9 TYPE 2 DIABETES MELLITUS W/OUT COMPLICATIONS: ICD-10-CM

## 2025-01-10 DIAGNOSIS — R79.89 OTHER SPECIFIED ABNORMAL FINDINGS OF BLOOD CHEMISTRY: ICD-10-CM

## 2025-01-10 DIAGNOSIS — E78.5 HYPERLIPIDEMIA, UNSPECIFIED: ICD-10-CM

## 2025-01-10 DIAGNOSIS — I10 ESSENTIAL (PRIMARY) HYPERTENSION: ICD-10-CM

## 2025-01-10 LAB — GLUCOSE BLDC GLUCOMTR-MCNC: 205

## 2025-01-10 PROCEDURE — 99214 OFFICE O/P EST MOD 30 MIN: CPT

## 2025-01-10 PROCEDURE — 82962 GLUCOSE BLOOD TEST: CPT

## 2025-01-10 PROCEDURE — G2211 COMPLEX E/M VISIT ADD ON: CPT

## 2025-01-11 ENCOUNTER — RX RENEWAL (OUTPATIENT)
Age: 70
End: 2025-01-11

## 2025-01-15 ENCOUNTER — OFFICE (OUTPATIENT)
Dept: URBAN - METROPOLITAN AREA CLINIC 94 | Facility: CLINIC | Age: 70
Setting detail: OPHTHALMOLOGY
End: 2025-01-15
Payer: MEDICARE

## 2025-01-15 ENCOUNTER — RX ONLY (RX ONLY)
Age: 70
End: 2025-01-15

## 2025-01-15 DIAGNOSIS — H16.223: ICD-10-CM

## 2025-01-15 DIAGNOSIS — H52.4: ICD-10-CM

## 2025-01-15 DIAGNOSIS — H16.222: ICD-10-CM

## 2025-01-15 DIAGNOSIS — E11.3293: ICD-10-CM

## 2025-01-15 DIAGNOSIS — H16.221: ICD-10-CM

## 2025-01-15 PROCEDURE — 83861 MICROFLUID ANALY TEARS: CPT | Mod: QW,LT | Performed by: OPHTHALMOLOGY

## 2025-01-15 PROCEDURE — 92015 DETERMINE REFRACTIVE STATE: CPT | Performed by: OPHTHALMOLOGY

## 2025-01-15 PROCEDURE — 92250 FUNDUS PHOTOGRAPHY W/I&R: CPT | Performed by: OPHTHALMOLOGY

## 2025-01-15 PROCEDURE — 83861 MICROFLUID ANALY TEARS: CPT | Mod: QW,RT | Performed by: OPHTHALMOLOGY

## 2025-01-15 PROCEDURE — 92014 COMPRE OPH EXAM EST PT 1/>: CPT | Performed by: OPHTHALMOLOGY

## 2025-01-15 ASSESSMENT — KERATOMETRY
METHOD_AUTO_MANUAL: AUTO
OD_AXISANGLE_DEGREES: 177
OS_AXISANGLE_DEGREES: 151
OS_K2POWER_DIOPTERS: 49.75
OS_K1POWER_DIOPTERS: 49.00
OD_K2POWER_DIOPTERS: 51.50
OD_K1POWER_DIOPTERS: 50.75

## 2025-01-15 ASSESSMENT — REFRACTION_AUTOREFRACTION
OS_AXIS: 089
OS_CYLINDER: -1.75
OD_AXIS: 094
OS_SPHERE: +2.25
OD_CYLINDER: -1.25
OD_SPHERE: +0.75

## 2025-01-15 ASSESSMENT — VISUAL ACUITY
OD_BCVA: 20/50
OS_BCVA: 20/40-1

## 2025-01-15 ASSESSMENT — REFRACTION_MANIFEST
OS_VA2: 20/20
OS_CYLINDER: -1.50
OS_SPHERE: +2.00
OS_VA1: 20/20
OD_SPHERE: +0.75
OS_ADD: +2.50
OS_AXIS: 089
OD_ADD: +2.50
OU_VA: 20/20
OD_VA2: 20/20
OD_AXIS: 094
OD_CYLINDER: -1.00
OD_VA1: 20/20-1

## 2025-01-15 ASSESSMENT — TONOMETRY
OS_IOP_MMHG: 16
OD_IOP_MMHG: 17

## 2025-01-15 ASSESSMENT — REFRACTION_CURRENTRX
OS_SPHERE: PLANO
OS_VPRISM_DIRECTION: SV
OD_SPHERE: PLANO
OD_OVR_VA: 20/
OS_AXIS: 141
OD_AXIS: 040
OS_CYLINDER: -1.50
OD_VPRISM_DIRECTION: SV
OD_CYLINDER: -0.50
OS_OVR_VA: 20/

## 2025-01-15 ASSESSMENT — SUPERFICIAL PUNCTATE KERATITIS (SPK)
OS_SPK: T 1+
OD_SPK: T 1+

## 2025-01-15 ASSESSMENT — CONFRONTATIONAL VISUAL FIELD TEST (CVF)
OS_FINDINGS: FULL
OD_FINDINGS: FULL

## 2025-03-03 ENCOUNTER — RX RENEWAL (OUTPATIENT)
Age: 70
End: 2025-03-03

## 2025-05-30 ENCOUNTER — APPOINTMENT (OUTPATIENT)
Dept: ENDOCRINOLOGY | Facility: CLINIC | Age: 70
End: 2025-05-30

## 2025-06-17 ENCOUNTER — OFFICE (OUTPATIENT)
Dept: URBAN - METROPOLITAN AREA CLINIC 94 | Facility: CLINIC | Age: 70
Setting detail: OPHTHALMOLOGY
End: 2025-06-17
Payer: MEDICARE

## 2025-06-17 DIAGNOSIS — H43.811: ICD-10-CM

## 2025-06-17 DIAGNOSIS — E11.3293: ICD-10-CM

## 2025-06-17 PROBLEM — H16.221 DRY EYE SYNDROME K SICCA; RIGHT EYE, LEFT EYE, BOTH EYES: Status: ACTIVE | Noted: 2025-06-17

## 2025-06-17 PROBLEM — H16.222 DRY EYE SYNDROME K SICCA; RIGHT EYE, LEFT EYE, BOTH EYES: Status: ACTIVE | Noted: 2025-06-17

## 2025-06-17 PROBLEM — H16.223 DRY EYE SYNDROME K SICCA; RIGHT EYE, LEFT EYE, BOTH EYES: Status: ACTIVE | Noted: 2025-06-17

## 2025-06-17 PROCEDURE — 99213 OFFICE O/P EST LOW 20 MIN: CPT | Performed by: OPHTHALMOLOGY

## 2025-06-17 PROCEDURE — 92134 CPTRZ OPH DX IMG PST SGM RTA: CPT | Performed by: OPHTHALMOLOGY

## 2025-06-17 PROCEDURE — 92235 FLUORESCEIN ANGRPH MLTIFRAME: CPT | Performed by: OPHTHALMOLOGY

## 2025-06-17 ASSESSMENT — KERATOMETRY
OS_K1POWER_DIOPTERS: 49.00
METHOD_AUTO_MANUAL: AUTO
OD_K2POWER_DIOPTERS: 51.50
OS_AXISANGLE_DEGREES: 151
OS_K2POWER_DIOPTERS: 49.75
OD_AXISANGLE_DEGREES: 177
OD_K1POWER_DIOPTERS: 50.75

## 2025-06-17 ASSESSMENT — REFRACTION_CURRENTRX
OS_SPHERE: PLANO
OS_OVR_VA: 20/
OS_CYLINDER: -1.50
OD_CYLINDER: -0.50
OS_VPRISM_DIRECTION: SV
OD_AXIS: 040
OS_AXIS: 141
OD_OVR_VA: 20/
OD_SPHERE: PLANO
OD_VPRISM_DIRECTION: SV

## 2025-06-17 ASSESSMENT — REFRACTION_MANIFEST
OD_SPHERE: +0.75
OS_VA2: 20/20
OS_VA1: 20/20
OD_VA2: 20/20
OD_VA1: 20/20-1
OD_CYLINDER: -1.00
OS_AXIS: 089
OS_CYLINDER: -1.50
OD_ADD: +2.50
OS_ADD: +2.50
OU_VA: 20/20
OD_AXIS: 094
OS_SPHERE: +2.00

## 2025-06-17 ASSESSMENT — SUPERFICIAL PUNCTATE KERATITIS (SPK)
OS_SPK: T 1+
OD_SPK: T 1+

## 2025-06-17 ASSESSMENT — CONFRONTATIONAL VISUAL FIELD TEST (CVF)
OS_FINDINGS: FULL
OD_FINDINGS: FULL

## 2025-06-17 ASSESSMENT — REFRACTION_AUTOREFRACTION
OS_AXIS: 089
OS_CYLINDER: -1.75
OS_SPHERE: +2.25
OD_SPHERE: +0.75
OD_AXIS: 094
OD_CYLINDER: -1.25

## 2025-06-17 ASSESSMENT — VISUAL ACUITY
OD_BCVA: 20/30
OS_BCVA: 20/30

## 2025-06-17 ASSESSMENT — TONOMETRY
OD_IOP_MMHG: 21
OS_IOP_MMHG: 18

## 2025-08-04 ENCOUNTER — APPOINTMENT (OUTPATIENT)
Dept: ENDOCRINOLOGY | Facility: CLINIC | Age: 70
End: 2025-08-04

## 2025-08-08 ENCOUNTER — RESULT CHARGE (OUTPATIENT)
Age: 70
End: 2025-08-08

## 2025-08-08 ENCOUNTER — APPOINTMENT (OUTPATIENT)
Dept: ENDOCRINOLOGY | Facility: CLINIC | Age: 70
End: 2025-08-08
Payer: MEDICARE

## 2025-08-08 VITALS
OXYGEN SATURATION: 98 % | SYSTOLIC BLOOD PRESSURE: 118 MMHG | HEIGHT: 55 IN | WEIGHT: 104 LBS | DIASTOLIC BLOOD PRESSURE: 70 MMHG | HEART RATE: 91 BPM | BODY MASS INDEX: 24.07 KG/M2

## 2025-08-08 DIAGNOSIS — R79.89 OTHER SPECIFIED ABNORMAL FINDINGS OF BLOOD CHEMISTRY: ICD-10-CM

## 2025-08-08 DIAGNOSIS — E78.5 HYPERLIPIDEMIA, UNSPECIFIED: ICD-10-CM

## 2025-08-08 DIAGNOSIS — E11.9 TYPE 2 DIABETES MELLITUS W/OUT COMPLICATIONS: ICD-10-CM

## 2025-08-08 DIAGNOSIS — I10 ESSENTIAL (PRIMARY) HYPERTENSION: ICD-10-CM

## 2025-08-08 LAB — GLUCOSE BLDC GLUCOMTR-MCNC: 186

## 2025-08-08 PROCEDURE — G2211 COMPLEX E/M VISIT ADD ON: CPT

## 2025-08-08 PROCEDURE — 99214 OFFICE O/P EST MOD 30 MIN: CPT

## 2025-08-08 PROCEDURE — 82962 GLUCOSE BLOOD TEST: CPT

## 2025-08-08 RX ORDER — BLOOD-GLUCOSE METER
W/DEVICE KIT MISCELLANEOUS
Qty: 1 | Refills: 0 | Status: ACTIVE | COMMUNITY
Start: 2025-08-08 | End: 1900-01-01

## 2025-09-02 ENCOUNTER — RX RENEWAL (OUTPATIENT)
Age: 70
End: 2025-09-02

## 2025-09-15 ENCOUNTER — RX RENEWAL (OUTPATIENT)
Age: 70
End: 2025-09-15

## 2025-09-15 RX ORDER — BLOOD-GLUCOSE METER
W/DEVICE EACH MISCELLANEOUS
Qty: 1 | Refills: 0 | Status: ACTIVE | COMMUNITY
Start: 2025-09-15 | End: 1900-01-01

## (undated) DEVICE — POLY TRAP ETRAP

## (undated) DEVICE — SNARE POLYP HEXAGONAL SM 13X2.4X240

## (undated) DEVICE — FORCEP ENDOJAW AGTR LC W NDL 2.8MM 230CM DISP

## (undated) DEVICE — ELCTR GROUNDING PAD ADULT COVIDIEN

## (undated) DEVICE — VALVE ENDO SURESEAL II 0-5FR

## (undated) DEVICE — STERIS DEFENDO 3-PIECE KIT (AIR/WATER, SUCTION & BIOPSY VALVES)